# Patient Record
Sex: FEMALE | Race: WHITE
[De-identification: names, ages, dates, MRNs, and addresses within clinical notes are randomized per-mention and may not be internally consistent; named-entity substitution may affect disease eponyms.]

---

## 2017-02-07 ENCOUNTER — HOSPITAL ENCOUNTER (OUTPATIENT)
Dept: HOSPITAL 47 - RADUSWWP | Age: 82
Discharge: HOME | End: 2017-02-07
Payer: MEDICARE

## 2017-02-07 DIAGNOSIS — I65.23: Primary | ICD-10-CM

## 2017-02-07 DIAGNOSIS — I34.0: ICD-10-CM

## 2017-02-07 PROCEDURE — 93880 EXTRACRANIAL BILAT STUDY: CPT

## 2017-02-07 NOTE — US
EXAMINATION TYPE: US carotid duplex BILAT

 

DATE OF EXAM: 2/7/2017 9:26 AM

 

COMPARISON: NONE

 

CLINICAL HISTORY: I65.23 STENOSIS OF CAROTID ARTERY.

 

EXAM MEASUREMENTS: 

 

RIGHT:  Peak Systolic Velocity (PSV) cm/sec

----- Right CCA:  68.2  

----- Right ICA:  87.2     

----- Right ECA:  79.4   

ICA/CCA ratio:  1.3    

 

RIGHT:  End Diastole cm/sec

----- Right CCA:  24.1   

----- Right ICA:  32.8      

----- Right ECA:  14.1     

 

LEFT:  Peak Systolic Velocity (PSV) cm/sec

----- Left CCA:  54.7  

----- Left ICA:  84.2   

----- Left ECA:  104.3  

ICA/CCA ratio:  1.5  

 

LEFT:  End Diastole cm/sec

----- Left CCA:  20.6  

----- Left ICA:  19.3   

----- Left ECA:  13.7 

 

VERTEBRALS (direction of flow):

Right Vertebral: Antegrade

Left Vertebral: Antegrade

 

TECHNOLOGIST IMPRESSION:  Mild to moderate plaque with no significant velocity elevations

 

Gray scale images show mild eccentric hyperechoic shadowing plaque at right carotid bulb. There is le
ss pronounced plaque at left carotid bulb. Velocity measurements and ratios remain within normal limi
ts in visualized portion of both internal carotid arteries.

 

IMPRESSION:  No hemodynamically significant stenosis is seen in either internal carotid artery.

## 2017-02-28 ENCOUNTER — HOSPITAL ENCOUNTER (OUTPATIENT)
Dept: HOSPITAL 47 - RADMAMWWP | Age: 82
Discharge: HOME | End: 2017-02-28
Payer: MEDICARE

## 2017-02-28 DIAGNOSIS — Z12.31: Primary | ICD-10-CM

## 2017-02-28 DIAGNOSIS — M81.0: ICD-10-CM

## 2017-02-28 PROCEDURE — 77080 DXA BONE DENSITY AXIAL: CPT

## 2017-02-28 NOTE — BD
EXAMINATION TYPE: MG DEXA axial skeleton.  

 

DATE OF EXAM: 2/28/2017 10:05 AM

 

COMPARISON: NONE

 

CLINICAL HISTORY:

 

Height:  63.5 IN

Weight:  160 LBS

 

FRAX RISK QUESTIONS:

Alcohol (3 or more units per day):  NO

Family History (Parent hip fracture):  NO

Glucocorticoids (More than 3mos):  NO

           (Ex: prednisone, prednisolone, methylprednisolone, dexamethasone, and hydrocortisone).    
     

History of Fracture in Adulthood: YES

Secondary Osteoporosis:

  1.  Type 1 Diabetes: NO

  2.  Hyperthyroidism: NO

  3.  Menopause before 45: NO AGE 50

  4.  Malnutrition: NO

  5.  Chronic liver disease: NO

Rheumatoid Arthritis: NO

Current Tobacco Use: NO

 

RISK FACTORS 

HISTORY OF: 

Hip Fracture (Right): YES

When: AGE 70

Surgery to Hip(right): YES

When: AGE 70

Other Fractures since Age 50: YES

When: RT HIP AGE 70

Active: MODERATE

Diet low in dairy products/other sources of calcium:  YES

Postmenopausal woman: AGE 50

Lost more than 2 inches in height since high school: YES  APPROX 4"

 

MEDICATIONS: 

Additional Medications: CALCIUM, VIT D, MAGNESIUM, EYE DROPS, BLOOD PRESSURE MEDS, PAIN PILL,GOUT MED
  

 

 

 

EXAM MEASUREMENTS: 

Bone mineral densitometry was performed using the Etohum System.

Bone mineral density as measured about the Lumbar spine is:  

----- L1-L4(G/cm2): 1.185

T Score Values are as follows:

----- L2: -1.8

----- L3: 2.9

----- L4: 2.7

----- L1-L4: 0.0

Bone mineral density BASELINE

 

PT HAD RT HIP FX AND SURGERY AGE 70

Bone mineral density about the L hip (g/cm2): 0.609

T Score values are as follows:

-----L Neck: -3.1

-----L Intertrochanter: -3.0

Bone mineral density BASELINE

 

 

IMPRESSION:

Osteoporosis (T Score less than -2.5) as noted by T Score values at the

There is increased fracture risk and therapy is usually indicated based on age.

Re-Screen 1-2 years.

 

 

 

 

 

NOTE:  T-SCORE=SD OF THE YOUNG ADULT MEAN.

## 2017-03-01 NOTE — MM
Reason for exam: screening  (asymptomatic).



History:

Patient is postmenopausal.



Physical Findings:

A clinical breast exam by your physician is recommended on an annual basis and 

results should be correlated with mammographic findings.



MG Screening Mammo w CAD

Bilateral CC and MLO view(s) were taken.  XCCL view(s) were taken of the left 

breast.

The breast tissue is almost entirely fat.

Finding: There are typically benign diffuse/scattered calcifications. There is no 

discrete abnormality.





ASSESSMENT: Benign, BI-RAD 2



RECOMMENDATION:

Routine screening mammogram of both breasts in 1 year.

## 2017-08-22 ENCOUNTER — HOSPITAL ENCOUNTER (OUTPATIENT)
Dept: HOSPITAL 47 - RADECHMAIN | Age: 82
Discharge: HOME | End: 2017-08-22
Payer: MEDICARE

## 2017-08-22 DIAGNOSIS — I27.2: ICD-10-CM

## 2017-08-22 DIAGNOSIS — I08.1: Primary | ICD-10-CM

## 2017-08-22 PROCEDURE — 93306 TTE W/DOPPLER COMPLETE: CPT

## 2017-08-22 NOTE — ECHOF
Referral Reason:I27.2 pulmonary htn



MEASUREMENTS

--------

HEIGHT: 162.6 cm

WEIGHT: 72.6 kg

BP: 130/66

IVSd:   1.2 cm     (0.6 - 1.1)

LVIDd:   3.5 cm     (3.9 - 5.3)

LVPWd:   1.1 cm     (0.6 - 1.1)

IVSs:   1.6 cm

LVIDs:   2.3 cm

LVPWs:   1.8 cm

Ao Diam:   3.6 cm     (2.0 - 3.7)

AV Cusp:   2.6 cm     (1.5 - 2.6)

LA Diam:   3.0 cm     (2.7 - 3.8)

MV EXCURSION:   21.518 mm     (> 18.000)

MV EF SLOPE:   94 mm/s     (70 - 150)

EPSS:   2.1 cm

MV E David:   0.81 m/s

MV DecT:   239 ms

MV A David:   0.93 m/s

MV E/A Ratio:   0.87 

RAP:   5.00 mmHg

RVSP:   32.13 mmHg







FINDINGS

--------

Sinus rhythm.

This was a technically good study.

There is borderline concentric left ventricular 

hypertrophy.   Overall left ventricular systolic 

function is normal with, an EF between 55 - 60 %.

The right ventricle is normal in size and function.

The left atrium is normal in size.

The right atrium is normal in size.

Aortic valve is trileaflet and is mildly thickened.

The mitral valve leaflets are mildly thickened.   Mild 

mitral regurgitation is present.

Mild tricuspid regurgitation present.   The right 

ventricular systolic pressure, as measured by Doppler, 

is 32.13mmHg.

Pulmonic valve appears structurally normal.



CONCLUSIONS

--------

1. Sinus rhythm.

2. Mild mitral regurgitation is present.

3. Mild tricuspid regurgitation present.

4. The right ventricular systolic pressure, as measured by 

Doppler, is 32.13mmHg.

5. Pulmonic valve appears structurally normal.

6. This was a technically good study.

7. There is borderline concentric left ventricular 

hypertrophy.

8. Overall left ventricular systolic function is normal 

with, an EF between 55 - 60 %.

9. The right ventricle is normal in size and function.

10. The left atrium is normal in size.

11. The right atrium is normal in size.

12. Aortic valve is trileaflet and is mildly thickened.

13. The mitral valve leaflets are mildly thickened.





SONOGRAPHER: Mague Taylor RDCS

## 2018-07-18 ENCOUNTER — HOSPITAL ENCOUNTER (INPATIENT)
Dept: HOSPITAL 47 - EC | Age: 83
LOS: 6 days | Discharge: HOME | DRG: 394 | End: 2018-07-24
Attending: INTERNAL MEDICINE | Admitting: INTERNAL MEDICINE
Payer: MEDICARE

## 2018-07-18 VITALS — BODY MASS INDEX: 22.1 KG/M2

## 2018-07-18 DIAGNOSIS — K55.031: Primary | ICD-10-CM

## 2018-07-18 DIAGNOSIS — K44.9: ICD-10-CM

## 2018-07-18 DIAGNOSIS — Z96.1: ICD-10-CM

## 2018-07-18 DIAGNOSIS — K57.30: ICD-10-CM

## 2018-07-18 DIAGNOSIS — D46.9: ICD-10-CM

## 2018-07-18 DIAGNOSIS — H40.9: ICD-10-CM

## 2018-07-18 DIAGNOSIS — M10.9: ICD-10-CM

## 2018-07-18 DIAGNOSIS — Z79.899: ICD-10-CM

## 2018-07-18 DIAGNOSIS — D61.818: ICD-10-CM

## 2018-07-18 DIAGNOSIS — K29.70: ICD-10-CM

## 2018-07-18 DIAGNOSIS — D51.9: ICD-10-CM

## 2018-07-18 DIAGNOSIS — Z96.643: ICD-10-CM

## 2018-07-18 DIAGNOSIS — Z87.11: ICD-10-CM

## 2018-07-18 DIAGNOSIS — E83.42: ICD-10-CM

## 2018-07-18 DIAGNOSIS — Z98.42: ICD-10-CM

## 2018-07-18 DIAGNOSIS — Z98.41: ICD-10-CM

## 2018-07-18 DIAGNOSIS — Z87.891: ICD-10-CM

## 2018-07-18 DIAGNOSIS — I13.10: ICD-10-CM

## 2018-07-18 DIAGNOSIS — N18.3: ICD-10-CM

## 2018-07-18 LAB
ALBUMIN SERPL-MCNC: 3.5 G/DL (ref 3.5–5)
ALP SERPL-CCNC: 56 U/L (ref 38–126)
ALT SERPL-CCNC: 24 U/L (ref 9–52)
ANION GAP SERPL CALC-SCNC: 8 MMOL/L
APTT BLD: 18.7 SEC (ref 22–30)
AST SERPL-CCNC: 17 U/L (ref 14–36)
BASOPHILS # BLD AUTO: 0 K/UL (ref 0–0.2)
BASOPHILS NFR BLD AUTO: 0 %
BUN SERPL-SCNC: 25 MG/DL (ref 7–17)
CALCIUM SPEC-MCNC: 9.5 MG/DL (ref 8.4–10.2)
CHLORIDE SERPL-SCNC: 106 MMOL/L (ref 98–107)
CK SERPL-CCNC: 51 U/L (ref 30–135)
CO2 SERPL-SCNC: 25 MMOL/L (ref 22–30)
EOSINOPHIL # BLD AUTO: 0 K/UL (ref 0–0.7)
EOSINOPHIL NFR BLD AUTO: 1 %
ERYTHROCYTE [DISTWIDTH] IN BLOOD BY AUTOMATED COUNT: 2.62 M/UL (ref 3.8–5.4)
ERYTHROCYTE [DISTWIDTH] IN BLOOD: 15.2 % (ref 11.5–15.5)
GLUCOSE SERPL-MCNC: 102 MG/DL (ref 74–99)
HCT VFR BLD AUTO: 29.1 % (ref 34–46)
HGB BLD-MCNC: 9.8 GM/DL (ref 11.4–16)
INR PPP: 1.1 (ref ?–1.2)
LYMPHOCYTES # SPEC AUTO: 1.5 K/UL (ref 1–4.8)
LYMPHOCYTES NFR SPEC AUTO: 32 %
MAGNESIUM SPEC-SCNC: 0.9 MG/DL (ref 1.6–2.3)
MCH RBC QN AUTO: 37.2 PG (ref 25–35)
MCHC RBC AUTO-ENTMCNC: 33.5 G/DL (ref 31–37)
MCV RBC AUTO: 111.1 FL (ref 80–100)
MONOCYTES # BLD AUTO: 0.2 K/UL (ref 0–1)
MONOCYTES NFR BLD AUTO: 5 %
NEUTROPHILS # BLD AUTO: 2.8 K/UL (ref 1.3–7.7)
NEUTROPHILS NFR BLD AUTO: 60 %
PLATELET # BLD AUTO: 49 K/UL (ref 150–450)
POTASSIUM SERPL-SCNC: 4 MMOL/L (ref 3.5–5.1)
PROT SERPL-MCNC: 6 G/DL (ref 6.3–8.2)
PT BLD: 10.4 SEC (ref 9–12)
SODIUM SERPL-SCNC: 139 MMOL/L (ref 137–145)
TROPONIN I SERPL-MCNC: <0.012 NG/ML (ref 0–0.03)
WBC # BLD AUTO: 4.7 K/UL (ref 3.8–10.6)

## 2018-07-18 PROCEDURE — 43239 EGD BIOPSY SINGLE/MULTIPLE: CPT

## 2018-07-18 PROCEDURE — 82272 OCCULT BLD FECES 1-3 TESTS: CPT

## 2018-07-18 PROCEDURE — 83921 ORGANIC ACID SINGLE QUANT: CPT

## 2018-07-18 PROCEDURE — 86901 BLOOD TYPING SEROLOGIC RH(D): CPT

## 2018-07-18 PROCEDURE — 84165 PROTEIN E-PHORESIS SERUM: CPT

## 2018-07-18 PROCEDURE — 85027 COMPLETE CBC AUTOMATED: CPT

## 2018-07-18 PROCEDURE — 82728 ASSAY OF FERRITIN: CPT

## 2018-07-18 PROCEDURE — 36415 COLL VENOUS BLD VENIPUNCTURE: CPT

## 2018-07-18 PROCEDURE — 96365 THER/PROPH/DIAG IV INF INIT: CPT

## 2018-07-18 PROCEDURE — 45380 COLONOSCOPY AND BIOPSY: CPT

## 2018-07-18 PROCEDURE — 84484 ASSAY OF TROPONIN QUANT: CPT

## 2018-07-18 PROCEDURE — 86334 IMMUNOFIX E-PHORESIS SERUM: CPT

## 2018-07-18 PROCEDURE — 83735 ASSAY OF MAGNESIUM: CPT

## 2018-07-18 PROCEDURE — 83550 IRON BINDING TEST: CPT

## 2018-07-18 PROCEDURE — 86900 BLOOD TYPING SEROLOGIC ABO: CPT

## 2018-07-18 PROCEDURE — 85610 PROTHROMBIN TIME: CPT

## 2018-07-18 PROCEDURE — 82747 ASSAY OF FOLIC ACID RBC: CPT

## 2018-07-18 PROCEDURE — 80048 BASIC METABOLIC PNL TOTAL CA: CPT

## 2018-07-18 PROCEDURE — 74022 RADEX COMPL AQT ABD SERIES: CPT

## 2018-07-18 PROCEDURE — 80053 COMPREHEN METABOLIC PANEL: CPT

## 2018-07-18 PROCEDURE — 86038 ANTINUCLEAR ANTIBODIES: CPT

## 2018-07-18 PROCEDURE — 86431 RHEUMATOID FACTOR QUANT: CPT

## 2018-07-18 PROCEDURE — 83540 ASSAY OF IRON: CPT

## 2018-07-18 PROCEDURE — 82607 VITAMIN B-12: CPT

## 2018-07-18 PROCEDURE — 96375 TX/PRO/DX INJ NEW DRUG ADDON: CPT

## 2018-07-18 PROCEDURE — 86850 RBC ANTIBODY SCREEN: CPT

## 2018-07-18 PROCEDURE — 88305 TISSUE EXAM BY PATHOLOGIST: CPT

## 2018-07-18 PROCEDURE — 83010 ASSAY OF HAPTOGLOBIN QUANT: CPT

## 2018-07-18 PROCEDURE — 99285 EMERGENCY DEPT VISIT HI MDM: CPT

## 2018-07-18 PROCEDURE — 85025 COMPLETE CBC W/AUTO DIFF WBC: CPT

## 2018-07-18 PROCEDURE — 82553 CREATINE MB FRACTION: CPT

## 2018-07-18 PROCEDURE — 83883 ASSAY NEPHELOMETRY NOT SPEC: CPT

## 2018-07-18 PROCEDURE — 85730 THROMBOPLASTIN TIME PARTIAL: CPT

## 2018-07-18 PROCEDURE — 85045 AUTOMATED RETICULOCYTE COUNT: CPT

## 2018-07-18 PROCEDURE — 93005 ELECTROCARDIOGRAM TRACING: CPT

## 2018-07-18 PROCEDURE — 82550 ASSAY OF CK (CPK): CPT

## 2018-07-18 RX ADMIN — CEFAZOLIN SCH MLS/HR: 330 INJECTION, POWDER, FOR SOLUTION INTRAMUSCULAR; INTRAVENOUS at 23:27

## 2018-07-18 RX ADMIN — MAGNESIUM SULFATE IN DEXTROSE SCH MLS/HR: 10 INJECTION, SOLUTION INTRAVENOUS at 21:20

## 2018-07-18 RX ADMIN — DORZOLAMIDE HYDROCHLORIDE SCH DROPS: 20 SOLUTION/ DROPS OPHTHALMIC at 23:27

## 2018-07-18 RX ADMIN — CARVEDILOL SCH MG: 12.5 TABLET, FILM COATED ORAL at 23:27

## 2018-07-18 RX ADMIN — MAGNESIUM SULFATE IN DEXTROSE SCH MLS/HR: 10 INJECTION, SOLUTION INTRAVENOUS at 23:27

## 2018-07-18 NOTE — ED
General Adult HPI





- General


Chief complaint: GI Bleed


Stated complaint: GI Bleed


Source: patient


Mode of arrival: EMS


Limitations: no limitations





- History of Present Illness


Initial comments: 





Dictation was produced using dragon dictation software. please excuse any 

grammatical, word or spelling errors. 





Chief Complaint: 84-year-old  female past medical history of glaucoma, 

gout, anemia presents with bright red blood per rectum 1 day.





History of Present Illness: Patient states that earlier today she was having a 

bowel movement she stood up and noted bright red blood per rectum.  She states 

that there was an exquisite amount of bright red blood in the toilet.  Patient 

was concerned and came to the emergency department.  Patient denies any 

abdominal pain.  No nausea vomiting.  No lightheadedness.  Patient otherwise 

feels well.  Denies any history of GI bleed.








The ROS documented in this emergency department record has been reviewed and 

confirmed by me.  Those systems with pertinent positive or negative responses 

have been documented in the HPI.  All other systems are other negative and/or 

noncontributory.





- Related Data


 Home Medications











 Medication  Instructions  Recorded  Confirmed


 


Allopurinol [Zyloprim] 100 mg PO DAILY 18


 


Carvedilol [Coreg] 12.5 mg PO BID 18


 


Losartan/Hydrochlorothiazide 1 tab PO DAILY 18





[Losartan-Hctz 100-12.5 mg Tab]   


 


Travon/D3/Mag11/Zinc//Alex/Bor 1 tab PO DAILY 18





[Caltrate 600+D Plus Tablet]   


 


Calcitriol [Rocaltrol] 0.25 mcg PO DAILY 18


 


Dorzolamide/Timolol/Pf [Cosopt Pf 1 drop RIGHT EYE BID 18





2%/5% Ophth Droperette]   


 


Latanoprost Ophth [Xalatan 0.005%] 1 drop BOTH EYES HS 18


 


Ranitidine HCl [Zantac] 150 mg PO HS 18











 Allergies











Allergy/AdvReac Type Severity Reaction Status Date / Time


 


No Known Allergies Allergy   Verified 18 20:28














Review of Systems


ROS Statement: 


Those systems with pertinent positive or pertinent negative responses have been 

documented in the HPI.





ROS Other: All systems not noted in ROS Statement are negative.





Past Medical History


Past Medical History: Hypertension


Additional Past Medical History / Comment(s): glaucoma, gout, anemia


History of Any Multi-Drug Resistant Organisms: None Reported


Past Surgical History: Joint Replacement, Orthopedic Surgery


Past Psychological History: No Psychological Hx Reported


Smoking Status: Former smoker


Past Alcohol Use History: None Reported


Past Drug Use History: None Reported





- Past Family History


  ** Father


Additional Family Medical History / Comment(s): Father  in his 60s





  ** Mother


Additional Family Medical History / Comment(s): Mother  in her 90s from old 

age.  Patient has 1 sister with no major medical problems.  Patient does not 

have any brothers or children.





General Exam





- General Exam Comments


Initial Comments: 











PHYSICAL EXAM:


General Impression: Alert and oriented x3, not in acute distress


HEENT: Normocephalic atraumatic, extra-ocular movements intact, pupils equal 

and reactive to light bilaterally, mucous membranes moist.


Cardiovascular: Heart regular rate and rhythm, S1&S2 audible, no murmurs, rubs 

or gallops


Chest: Lungs clear to auscultation bilaterally, no rhonchi, no wheeze, no rales


Abdomen: Bowel sounds present, abdomen soft, non-tender, non-distended, no 

organomegaly


Musculoskeletal: Pulses present and equal in all extremities, no peripheral 

edema


Motor: Power 5/5 bilaterally, no focal deficits noted


Neurological: CN II-XII grossly intact, no focal motor or sensory deficits noted


Skin: Intact with no visualized rashes


Psych: Normal affect and mood


Rectal exam: Rectal skin tag, still guaiac positive for gross blood.


Limitations: no limitations





Course


 Vital Signs











  18





  19:32 21:21


 


Temperature 97.7 F 


 


Pulse Rate 86 73


 


Respiratory 20 20





Rate  


 


Blood Pressure 162/72 155/65


 


O2 Sat by Pulse 98 96





Oximetry  














Medical Decision Making





- Medical Decision Making





ED course: 84-year-old female with bright red blood per rectum.  Vital signs 

upon arrival shows hypertension 162/72, rest of vital signs within normal 

limits.Laboratory evaluation obtained.  No leukocytosis.  Hemoglobin is 9.8.  

There findings to suggest macrocytic anemia.  Platelet count is 49.  Coag panel 

shows findings within acceptable limits.  There is no old labs for comparison.  

Metabolic panel obtained.  Elevated renal markers.  Glucose 12.  Magnesium is 

0.9.  Stool occult blood is positive.  Struck suspicion for lower GI bleed 

however there is possibility of this upper GI bleed.  Patient given Protonix.  

Patient not bleeding at the moment.  No clear indication for platelet 

transfusion at this time.  Magnesium replaced.  Patient be admitted with 

gastroenterology consult.  Patient continued to receive fluid resuscitation and 

magnesium.











EKG Interpretation:


A 12 lead EKG was obtained. It was interpreted by myself and attending 

physician. There is a P wave before every QRS complex. Rate is 75. Rhythm is 

sinus rhythm, NM interval 156, QRS 72, . QT is not prolonged. No ST 

segment depression or elevation.  Overall, this EKG is unremarkable





- Lab Data


Result diagrams: 


 18 07:22





 18 07:22


 Lab Results











  18 Range/Units





  19:46 19:46 19:46 


 


WBC    4.7  (3.8-10.6)  k/uL


 


RBC    2.62 L  (3.80-5.40)  m/uL


 


Hgb    9.8 L  (11.4-16.0)  gm/dL


 


Hct    29.1 L  (34.0-46.0)  %


 


MCV    111.1 H  (80.0-100.0)  fL


 


MCH    37.2 H  (25.0-35.0)  pg


 


MCHC    33.5  (31.0-37.0)  g/dL


 


RDW    15.2  (11.5-15.5)  %


 


Plt Count    49 L*  (150-450)  k/uL


 


Neutrophils %    60  %


 


Lymphocytes %    32  %


 


Monocytes %    5  %


 


Eosinophils %    1  %


 


Basophils %    0  %


 


Neutrophils #    2.8  (1.3-7.7)  k/uL


 


Lymphocytes #    1.5  (1.0-4.8)  k/uL


 


Monocytes #    0.2  (0-1.0)  k/uL


 


Eosinophils #    0.0  (0-0.7)  k/uL


 


Basophils #    0.0  (0-0.2)  k/uL


 


Polychromasia    Present  


 


Macrocytosis    Marked  


 


PT     (9.0-12.0)  sec


 


INR     (<1.2)  


 


APTT     (22.0-30.0)  sec


 


Sodium     (137-145)  mmol/L


 


Potassium     (3.5-5.1)  mmol/L


 


Chloride     ()  mmol/L


 


Carbon Dioxide     (22-30)  mmol/L


 


Anion Gap     mmol/L


 


BUN     (7-17)  mg/dL


 


Creatinine     (0.52-1.04)  mg/dL


 


Est GFR (CKD-EPI)AfAm     (>60 ml/min/1.73 sqM)  


 


Est GFR (CKD-EPI)NonAf     (>60 ml/min/1.73 sqM)  


 


Glucose     (74-99)  mg/dL


 


Calcium     (8.4-10.2)  mg/dL


 


Magnesium     (1.6-2.3)  mg/dL


 


Total Bilirubin     (0.2-1.3)  mg/dL


 


AST     (14-36)  U/L


 


ALT     (9-52)  U/L


 


Alkaline Phosphatase     ()  U/L


 


Total Creatine Kinase   51   ()  U/L


 


CK-MB (CK-2)   1.4   (0.0-2.4)  ng/mL


 


CK-MB (CK-2) Rel Index   2.7   


 


Troponin I   <0.012   (0.000-0.034)  ng/mL


 


Total Protein     (6.3-8.2)  g/dL


 


Albumin     (3.5-5.0)  g/dL


 


Stool Occult Blood  Positive H    (Negative)  


 


Blood Type     


 


Blood Type Recheck     


 


Antibody Screen     


 


Spec Expiration Date     














  18 Range/Units





  19:46 19:46 19:46 


 


WBC     (3.8-10.6)  k/uL


 


RBC     (3.80-5.40)  m/uL


 


Hgb     (11.4-16.0)  gm/dL


 


Hct     (34.0-46.0)  %


 


MCV     (80.0-100.0)  fL


 


MCH     (25.0-35.0)  pg


 


MCHC     (31.0-37.0)  g/dL


 


RDW     (11.5-15.5)  %


 


Plt Count     (150-450)  k/uL


 


Neutrophils %     %


 


Lymphocytes %     %


 


Monocytes %     %


 


Eosinophils %     %


 


Basophils %     %


 


Neutrophils #     (1.3-7.7)  k/uL


 


Lymphocytes #     (1.0-4.8)  k/uL


 


Monocytes #     (0-1.0)  k/uL


 


Eosinophils #     (0-0.7)  k/uL


 


Basophils #     (0-0.2)  k/uL


 


Polychromasia     


 


Macrocytosis     


 


PT   10.4   (9.0-12.0)  sec


 


INR   1.1   (<1.2)  


 


APTT   18.7 L   (22.0-30.0)  sec


 


Sodium  139    (137-145)  mmol/L


 


Potassium  4.0    (3.5-5.1)  mmol/L


 


Chloride  106    ()  mmol/L


 


Carbon Dioxide  25    (22-30)  mmol/L


 


Anion Gap  8    mmol/L


 


BUN  25 H    (7-17)  mg/dL


 


Creatinine  1.50 H    (0.52-1.04)  mg/dL


 


Est GFR (CKD-EPI)AfAm  37    (>60 ml/min/1.73 sqM)  


 


Est GFR (CKD-EPI)NonAf  32    (>60 ml/min/1.73 sqM)  


 


Glucose  102 H    (74-99)  mg/dL


 


Calcium  9.5    (8.4-10.2)  mg/dL


 


Magnesium  0.9 L*    (1.6-2.3)  mg/dL


 


Total Bilirubin  0.5    (0.2-1.3)  mg/dL


 


AST  17    (14-36)  U/L


 


ALT  24    (9-52)  U/L


 


Alkaline Phosphatase  56    ()  U/L


 


Total Creatine Kinase     ()  U/L


 


CK-MB (CK-2)     (0.0-2.4)  ng/mL


 


CK-MB (CK-2) Rel Index     


 


Troponin I     (0.000-0.034)  ng/mL


 


Total Protein  6.0 L    (6.3-8.2)  g/dL


 


Albumin  3.5    (3.5-5.0)  g/dL


 


Stool Occult Blood     (Negative)  


 


Blood Type    A Negative  


 


Blood Type Recheck    No  


 


Antibody Screen    NEGATIVE  


 


Spec Expiration Date    2018  














Disposition


Clinical Impression: 


 GI bleed





Disposition: ADMITTED AS IP TO THIS [facility]


Condition: Fair


Is patient prescribed a controlled substance at d/c from ED?: No


Time of Disposition: 12:30

## 2018-07-19 LAB
ALBUMIN SERPL-MCNC: 3.1 G/DL (ref 3.5–5)
ALP SERPL-CCNC: 51 U/L (ref 38–126)
ALT SERPL-CCNC: 26 U/L (ref 9–52)
ANION GAP SERPL CALC-SCNC: 7 MMOL/L
AST SERPL-CCNC: 15 U/L (ref 14–36)
BASOPHILS # BLD AUTO: 0 K/UL (ref 0–0.2)
BASOPHILS NFR BLD AUTO: 0 %
BUN SERPL-SCNC: 22 MG/DL (ref 7–17)
CALCIUM SPEC-MCNC: 9.1 MG/DL (ref 8.4–10.2)
CHLORIDE SERPL-SCNC: 106 MMOL/L (ref 98–107)
CO2 SERPL-SCNC: 27 MMOL/L (ref 22–30)
EOSINOPHIL # BLD AUTO: 0 K/UL (ref 0–0.7)
EOSINOPHIL NFR BLD AUTO: 1 %
ERYTHROCYTE [DISTWIDTH] IN BLOOD BY AUTOMATED COUNT: 2.38 M/UL (ref 3.8–5.4)
ERYTHROCYTE [DISTWIDTH] IN BLOOD: 15.2 % (ref 11.5–15.5)
GLUCOSE SERPL-MCNC: 93 MG/DL (ref 74–99)
HCT VFR BLD AUTO: 27.1 % (ref 34–46)
HGB BLD-MCNC: 8.8 GM/DL (ref 11.4–16)
LYMPHOCYTES # SPEC AUTO: 1.5 K/UL (ref 1–4.8)
LYMPHOCYTES NFR SPEC AUTO: 36 %
MCH RBC QN AUTO: 36.9 PG (ref 25–35)
MCHC RBC AUTO-ENTMCNC: 32.5 G/DL (ref 31–37)
MCV RBC AUTO: 113.5 FL (ref 80–100)
MONOCYTES # BLD AUTO: 0.3 K/UL (ref 0–1)
MONOCYTES NFR BLD AUTO: 6 %
NEUTROPHILS # BLD AUTO: 2.3 K/UL (ref 1.3–7.7)
NEUTROPHILS NFR BLD AUTO: 54 %
PLATELET # BLD AUTO: 42 K/UL (ref 150–450)
POTASSIUM SERPL-SCNC: 4 MMOL/L (ref 3.5–5.1)
PROT SERPL-MCNC: 5.5 G/DL (ref 6.3–8.2)
SODIUM SERPL-SCNC: 140 MMOL/L (ref 137–145)
WBC # BLD AUTO: 4.2 K/UL (ref 3.8–10.6)

## 2018-07-19 RX ADMIN — HYDROCHLOROTHIAZIDE SCH MG: 12.5 CAPSULE ORAL at 08:46

## 2018-07-19 RX ADMIN — ALLOPURINOL SCH MG: 100 TABLET ORAL at 08:46

## 2018-07-19 RX ADMIN — PANTOPRAZOLE SODIUM SCH MG: 40 INJECTION, POWDER, FOR SOLUTION INTRAVENOUS at 08:45

## 2018-07-19 RX ADMIN — CARVEDILOL SCH MG: 12.5 TABLET, FILM COATED ORAL at 17:38

## 2018-07-19 RX ADMIN — POTASSIUM CHLORIDE SCH: 14.9 INJECTION, SOLUTION INTRAVENOUS at 18:15

## 2018-07-19 RX ADMIN — TIMOLOL MALEATE SCH DROPS: 5 SOLUTION OPHTHALMIC at 08:47

## 2018-07-19 RX ADMIN — CARVEDILOL SCH MG: 12.5 TABLET, FILM COATED ORAL at 08:46

## 2018-07-19 RX ADMIN — CEFAZOLIN SCH MLS/HR: 330 INJECTION, POWDER, FOR SOLUTION INTRAMUSCULAR; INTRAVENOUS at 14:34

## 2018-07-19 RX ADMIN — DORZOLAMIDE HYDROCHLORIDE SCH DROPS: 20 SOLUTION/ DROPS OPHTHALMIC at 08:46

## 2018-07-19 RX ADMIN — DORZOLAMIDE HYDROCHLORIDE SCH DROPS: 20 SOLUTION/ DROPS OPHTHALMIC at 21:25

## 2018-07-19 RX ADMIN — LOSARTAN POTASSIUM SCH MG: 50 TABLET, FILM COATED ORAL at 08:47

## 2018-07-19 RX ADMIN — PANTOPRAZOLE SODIUM SCH MG: 40 INJECTION, POWDER, FOR SOLUTION INTRAVENOUS at 21:25

## 2018-07-19 NOTE — P.CONS
History of Present Illness





- Reason for Consult


Consult date: 07/19/18


Rectal bleeding


Requesting physician: Marc Talley





- History of Present Illness


84-year-old female with a past medical history of remote peptic ulcer disease 

in the 1980s, GI bleed 2007 secondary to perforated duodenal ulcer status post 

pyloroplasty, anemia B12 deficiency, hypertension, hypertensive cardiovascular 

disease.  Patient presented with painless rectal bleeding that started 

yesterday.  She hasn't been feeling well for over the past week that it was he 

related.  Yesterday she passed one large bowel movement that was bright red 

burgundy in nature without abdominal pain.  Denies coffee-ground hematemesis, 

melena.  





Admission hemoglobin 9.8.  .  Platelet 49,000.  INR 1.1.  BUN 25.  

Creatinine 1.5.  Hemoccult stool positive.  No history of thrombocytopenia.  No 

changes in medications.





Presently she feels well without abdominal pain.  No recurrence of rectal 

bleeding.





Last EGD record was in 2008 no evidence of peptic ulcer disease.  Dr. Kinsey 

office called last  colonoscopy performed 2009 report not available to fax.











Review of Systems


Constitutional: Denies fever, chills, sweats, weight gain, or loss.


HEENT: Negative for migraines, blurred vision or loss, earaches, drainage, 

tinnitus, oral mucosal lesions, dysphagia, or odynophagia.


CARDIAC: Negative for chest pain, arrhythmias, or palpitation.


RESPIRATORY: Negative for shortness of breath, hemoptysis, cough, or sputum 

production.


GI: See HPI for pertinent findings.


: Negative for hematuria, urgency, frequency, polyuria, or dysuria.


GYNc: Denies possibility of pregnancy.  Negative vaginal discharge.


MUSCULOSKELETAL: Negative for muscle aches, swelling, arthritis, and 

arthralgias.  


NEUROLOGIC: Negative for stroke or TIA.


ENDOCRINE: Negative for thyroid problems.


SKIN: Negative for rash or itching.


PSYCHIATRIC: Negative history for depression and anxiety








Past Medical History


Past Medical History: Hypertension


Additional Past Medical History / Comment(s): glaucoma, gout, anemia


History of Any Multi-Drug Resistant Organisms: None Reported


Past Surgical History: Joint Replacement, Orthopedic Surgery


Past Psychological History: No Psychological Hx Reported


Smoking Status: Former smoker


Past Alcohol Use History: None Reported


Past Drug Use History: None Reported





Medications and Allergies


 Home Medications











 Medication  Instructions  Recorded  Confirmed  Type


 


Allopurinol [Zyloprim] 100 mg PO DAILY 07/18/18 07/19/18 History


 


Carvedilol [Coreg] 12.5 mg PO BID 07/18/18 07/19/18 History


 


Losartan/Hydrochlorothiazide 1 tab PO DAILY 07/18/18 07/19/18 History





[Losartan-Hctz 100-12.5 mg Tab]    


 


Travon/D3/Mag11/Zinc//Alex/Bor 1 tab PO DAILY 07/19/18 07/19/18 History





[Caltrate 600+D Plus Tablet]    


 


Calcitriol [Rocaltrol] 0.25 mcg PO DAILY 07/19/18 07/19/18 History


 


Dorzolamide/Timolol/Pf [Cosopt Pf 1 drop RIGHT EYE BID 07/19/18 07/19/18 History





2%/5% Ophth Droperette]    


 


Latanoprost Ophth [Xalatan 0.005%] 1 drop BOTH EYES HS 07/19/18 07/19/18 History


 


Ranitidine HCl [Zantac] 150 mg PO HS 07/19/18 07/19/18 History











 Allergies











Allergy/AdvReac Type Severity Reaction Status Date / Time


 


No Known Allergies Allergy   Verified 07/18/18 20:28














Physical Exam


Vitals: 


 Vital Signs











  Temp Pulse Pulse Resp BP BP Pulse Ox


 


 07/19/18 06:06  96.4 F L   60  18   119/63  97


 


 07/18/18 23:00  97.7 F   69  17   147/70  100


 


 07/18/18 21:21   73   20  155/65   96


 


 07/18/18 19:32  97.7 F  86   20  162/72   98








 Intake and Output











 07/18/18 07/19/18 07/19/18





 22:59 06:59 14:59


 


Intake Total 500  


 


Balance 500  


 


Intake:   


 


  Amount of Fluid Infused ( 500  





  ml)   


 


Other:   


 


  # Voids  1 


 


  Weight 66.088 kg 66.088 kg 











General appearance: The patient is alert, oriented, in no acute distress.


HET: Head is normocephalic and atraumatic.  Pupils are equal and reactive.  

Oropharynx is clear without lesions.


Neck: Supple without lymphadenopathy.  Trachea midline.


Heart: S1 S2.  Regular rate and rhythm.


Lungs: No crackles or wheezes are heard.


Abdomen: Soft, nontender, nondistended with  bowel sounds.  No peritoneal 

signs.  No palpable organomegaly or masses.


Extremities: Normal skin color and turgor.  No cyanosis, rash, ulceration, 

clubbing, or edema.  Radial and pedal pulses are 2/4 bilaterally.


Neurological: No focal deficits.  Strength and sensation are grossly intact.








Results


CBC & Chem 7: 


 07/19/18 07:22





 07/19/18 07:22


Labs: 


 Abnormal Lab Results - Last 24 Hours (Table)











  07/18/18 07/18/18 07/18/18 Range/Units





  19:46 19:46 19:46 


 


RBC   2.62 L   (3.80-5.40)  m/uL


 


Hgb   9.8 L   (11.4-16.0)  gm/dL


 


Hct   29.1 L   (34.0-46.0)  %


 


MCV   111.1 H   (80.0-100.0)  fL


 


MCH   37.2 H   (25.0-35.0)  pg


 


Plt Count   49 L*   (150-450)  k/uL


 


APTT     (22.0-30.0)  sec


 


BUN    25 H  (7-17)  mg/dL


 


Creatinine    1.50 H  (0.52-1.04)  mg/dL


 


Glucose    102 H  (74-99)  mg/dL


 


Magnesium    0.9 L*  (1.6-2.3)  mg/dL


 


Total Protein    6.0 L  (6.3-8.2)  g/dL


 


Stool Occult Blood  Positive H    (Negative)  














  07/18/18 Range/Units





  19:46 


 


RBC   (3.80-5.40)  m/uL


 


Hgb   (11.4-16.0)  gm/dL


 


Hct   (34.0-46.0)  %


 


MCV   (80.0-100.0)  fL


 


MCH   (25.0-35.0)  pg


 


Plt Count   (150-450)  k/uL


 


APTT  18.7 L  (22.0-30.0)  sec


 


BUN   (7-17)  mg/dL


 


Creatinine   (0.52-1.04)  mg/dL


 


Glucose   (74-99)  mg/dL


 


Magnesium   (1.6-2.3)  mg/dL


 


Total Protein   (6.3-8.2)  g/dL


 


Stool Occult Blood   (Negative)  














Assessment and Plan


(1) Rectal bleeding


Narrative/Plan: 


Possible colonic diverticular in nature exacerbated by thrombocytopenia.  Upper 

GI source cannot be entirely excluded patient is without epigastric pain 

hematemesis or melena.  History of underlying perforated bleeding peptic 

duodenal ulcer disease in 2007.


Current Visit: Yes   Status: Acute   Code(s): K62.5 - HEMORRHAGE OF ANUS AND 

RECTUM   SNOMED Code(s): 34681291


   





(2) Thrombocytopenia


Narrative/Plan: 


Etiology unclear


Current Visit: Yes   Status: Acute   Code(s): D69.6 - THROMBOCYTOPENIA, 

UNSPECIFIED   SNOMED Code(s): 751385511


   





(3) Anemia


Narrative/Plan: 


Macrocytic anemia.  History of B12 deficiency anemia component of acute blood 

loss


Current Visit: Yes   Status: Acute   Code(s): D64.9 - ANEMIA, UNSPECIFIED   

SNOMED Code(s): 377166605


   


Plan: 


1.  Will allow clear liquids this morning.  CBC monitoring; await morning 

result reevaluate platelet count.  Protonix 40 mg daily.  Inpatient endoscopy 

discussed EGD/colonoscopy scheduled tomorrow. Dr. Griffiths prefers platelet count 

to be greater than 50,000 before proceeding with endoscopic exams especially if 

biopsies and/or polypectomies need to be performed therefore will transfuse 

platelets tomorrow morning prior to procedures.  Will follow closely with you.








Thank you for this kind referral and the opportunity to participate in the care 

of your patient.  This consultation was discussed with Dr. Griffiths.  The 

impression and plan of care have been directed as dictated.

## 2018-07-19 NOTE — P.HPIM
History of Present Illness


H&P Date: 18


Chief Complaint: Bright red rectal bleeding





This is an 84-year-old  female patient of Dr. Sheldon. with past history 

of hypertension, glaucoma, gout, anemia.  Patient states the last time she had 

blood work done with Dr. Sheldon, he told her that she was anemic.  Patient has 

had colonoscopies in the past with Dr. Kinsey on several occasions and she 

denies any problems with those in the past.  She states that yesterday she had 

bright red bleeding in the toilet.  She denies any maroon color to her stools.  

She states she has had this 9010 years ago which time she had vomiting and 

bloody stool secondary to a perforated ulcer.  Patient denies having any 

abdominal pain but states her abdomen just doesn't feel right.  She denies any 

lightheadedness.  Patient came into [facility] emergency 

center for evaluation.  She was found to have a hemoglobin of 9.8, platelet 

count is currently 42, INR 1.1, creatinine 1.5.  Hemoccult blood was positive.  

Patient has been admitted to the Medr floor and consult with GI has been 

requested.  Magnesium was also low at 0.8 and patient was given 2 g of 

magnesium sulfate.





Review of Systems


All systems: negative


Constitutional: Denies chills, Denies fatigue, Denies fever, Denies sweats, 

Denies weakness, Denies weight loss


Eyes: denies blurred vision, denies pain


Ears, nose, mouth and throat: Denies headache, Denies sore throat, Denies 

vertigo


Cardiovascular: Denies chest pain, Denies decreased exercise tolerance, Denies 

dyspnea on exertion, Denies edema, Denies leg edema, Denies lightheadedness, 

Denies shortness of breath, Denies syncope


Respiratory: Denies cough, Denies cough with sputum, Denies dyspnea, Denies 

excessive sputum, Denies hemoptysis, Denies home oxygen, Denies wheezing


Gastrointestinal: Reports melena, Denies abdominal pain, Denies diarrhea, 

Denies nausea, Denies vomiting


Genitourinary: Denies dysuria, Denies hematuria


Musculoskeletal: Denies frequent falls, Denies myalgias


Integumentary: Denies pruritus, Denies rash


Neurological: Denies numbness, Denies weakness


Psychiatric: Denies anxiety, Denies depression


Endocrine: Denies fatigue, Denies weight change





Past Medical History


Past Medical History: Hypertension


Additional Past Medical History / Comment(s): glaucoma, gout, anemia


History of Any Multi-Drug Resistant Organisms: None Reported


Past Surgical History: Joint Replacement, Orthopedic Surgery


Additional Past Surgical History / Comment(s): Partial right hip replacement, 

bilateral cataract removal and intraocular lens implants


Past Psychological History: No Psychological Hx Reported


Smoking Status: Former smoker


Past Alcohol Use History: None Reported


Additional Past Alcohol Use History / Comment(s): Patient was a smoker one pack 

per day for 40 years ago 10 years ago.  She drinks alcohol rarely.  She denies 

any marijuana or street drug use.  She does not have CPAP, oxygen or nebulizer 

at home.


Past Drug Use History: None Reported





- Past Family History


  ** Father


Additional Family Medical History / Comment(s): Father  in his 60s





  ** Mother


Additional Family Medical History / Comment(s): Mother  in her 90s from old 

age.  Patient has 1 sister with no major medical problems.  Patient does not 

have any brothers or children.





Medications and Allergies


 Home Medications











 Medication  Instructions  Recorded  Confirmed  Type


 


Allopurinol [Zyloprim] 100 mg PO DAILY 18 History


 


Carvedilol [Coreg] 12.5 mg PO BID 18 History


 


Losartan/Hydrochlorothiazide 1 tab PO DAILY 18 History





[Losartan-Hctz 100-12.5 mg Tab]    


 


Travon/D3/Mag11/Zinc//Alex/Bor 1 tab PO DAILY 18 History





[Caltrate 600+D Plus Tablet]    


 


Calcitriol [Rocaltrol] 0.25 mcg PO DAILY 18 History


 


Dorzolamide/Timolol/Pf [Cosopt Pf 1 drop RIGHT EYE BID 18 History





2%/5% Ophth Droperette]    


 


Latanoprost Ophth [Xalatan 0.005%] 1 drop BOTH EYES HS 18 History


 


Ranitidine HCl [Zantac] 150 mg PO HS 18 History











 Allergies











Allergy/AdvReac Type Severity Reaction Status Date / Time


 


No Known Allergies Allergy   Verified 18 20:28














Physical Exam


Vitals: 


 Vital Signs











  Temp Pulse Pulse Resp BP BP Pulse Ox


 


 18 06:06  96.4 F L   60  18   119/63  97


 


 18 23:00  97.7 F   69  17   147/70  100


 


 18 21:21   73   20  155/65   96


 


 18 19:32  97.7 F  86   20  162/72   98








 Intake and Output











 18





 22:59 06:59 14:59


 


Intake Total 500  


 


Balance 500  


 


Intake:   


 


  Amount of Fluid Infused ( 500  





  ml)   


 


Other:   


 


  # Voids  1 


 


  Weight 66.088 kg 66.088 kg 

















Gen: This is an 84-year-old  female.  She is sitting up in bed and 

appears to be comfortable and in no acute distress.


HEENT: Head is atraumatic, normocephalic. Pupils equal, round. Sclerae is 

anicteric. 


NECK: Supple. No JVD. No lymphadenopathy. No thyromegaly. 


LUNGS: Clear to auscultation. No wheezes or rhonchi.  No intercostal 

retractions.


HEART: Regular rate and rhythm. No murmur. 


ABDOMEN: Soft. Bowel sounds are present. No masses.  No tenderness.


EXTREMITIES: No pedal edema.  No calf tenderness.


NEUROLOGICAL: Patient is awake, alert and oriented x3. Cranial nerves 2 through 

12 are grossly intact. 








Results


CBC & Chem 7: 


 18 07:22





 18 07:22


Labs: 


 Abnormal Lab Results - Last 24 Hours (Table)











  18 Range/Units





  19:46 19:46 19:46 


 


RBC   2.62 L   (3.80-5.40)  m/uL


 


Hgb   9.8 L   (11.4-16.0)  gm/dL


 


Hct   29.1 L   (34.0-46.0)  %


 


MCV   111.1 H   (80.0-100.0)  fL


 


MCH   37.2 H   (25.0-35.0)  pg


 


Plt Count   49 L*   (150-450)  k/uL


 


APTT     (22.0-30.0)  sec


 


BUN    25 H  (7-17)  mg/dL


 


Creatinine    1.50 H  (0.52-1.04)  mg/dL


 


Glucose    102 H  (74-99)  mg/dL


 


Magnesium    0.9 L*  (1.6-2.3)  mg/dL


 


Total Protein    6.0 L  (6.3-8.2)  g/dL


 


Albumin     (3.5-5.0)  g/dL


 


Stool Occult Blood  Positive H    (Negative)  














  18 Range/Units





  19:46 07:22 07:22 


 


RBC   2.38 L   (3.80-5.40)  m/uL


 


Hgb   8.8 L   (11.4-16.0)  gm/dL


 


Hct   27.1 L   (34.0-46.0)  %


 


MCV   113.5 H   (80.0-100.0)  fL


 


MCH   36.9 H   (25.0-35.0)  pg


 


Plt Count   42 L*   (150-450)  k/uL


 


APTT  18.7 L    (22.0-30.0)  sec


 


BUN    22 H  (7-17)  mg/dL


 


Creatinine    1.47 H  (0.52-1.04)  mg/dL


 


Glucose     (74-99)  mg/dL


 


Magnesium     (1.6-2.3)  mg/dL


 


Total Protein    5.5 L  (6.3-8.2)  g/dL


 


Albumin    3.1 L  (3.5-5.0)  g/dL


 


Stool Occult Blood     (Negative)  














Thrombosis Risk Factor Assmnt





- DVT/VTE Prophylaxis


DVT/VTE Prophylaxis: Mechanical Prophylaxis ordered





- Choose All That Apply


Any of the Below Risk Factors Present?: Yes


Each Risk Factor Represents 3 Points: Age 75 years or older


Thrombosis Risk Factor Assessment Total Risk Factor Score: 3


Thrombosis Risk Factor Assessment Level: Moderate Risk





Assessment and Plan


Plan: 





1.  Acute lower GI bleed.  Patient is currently on a clear liquid diet with 

plan for EGD and colonoscopy tomorrow.  Continue Protonix.  Monitor CBC daily.





2.  Chronic anemia of unclear etiology.  Possible component of acute blood 

loss.  Baseline hemoglobin is not known.





3.  Thrombocytopenia which is new for patient.  Consult with Dr. Russell.





4.  Hypomagnesemia status post replacement.  We will check repeat level and 

follow daily.





5.  Glaucoma.  Continue eyedrops.





6.  Hypertension.  Continue Coreg 12.5 mg twice daily, hydrochlorothiazide 12.5 

mg daily, losartan 100 mg daily.





7.  Gout stable.  Continue allopurinol 100 mg daily.





8.  Chronic kidney disease stage 3b.  Continue IV fluids at 75 mL per hour.





9.  DVT prophylaxis.  MARLO hose and SCDs.





10.  GI prophylaxis.  Protonix.





Patient will be admitted to the hospital for a minimum of 2 night stay.


Discharge plan: To be determined


Impression and plan of care have been directed as dictated by the signing 

physician.  Salima Bergeron nurse practitioner acting as scribe for signing 

physician.

## 2018-07-20 LAB
ANION GAP SERPL CALC-SCNC: 5 MMOL/L
BASOPHILS # BLD AUTO: 0 K/UL (ref 0–0.2)
BASOPHILS NFR BLD AUTO: 0 %
BUN SERPL-SCNC: 19 MG/DL (ref 7–17)
CALCIUM SPEC-MCNC: 8.8 MG/DL (ref 8.4–10.2)
CHLORIDE SERPL-SCNC: 108 MMOL/L (ref 98–107)
CO2 SERPL-SCNC: 24 MMOL/L (ref 22–30)
EOSINOPHIL # BLD AUTO: 0 K/UL (ref 0–0.7)
EOSINOPHIL NFR BLD AUTO: 1 %
ERYTHROCYTE [DISTWIDTH] IN BLOOD BY AUTOMATED COUNT: 2.03 M/UL (ref 3.8–5.4)
ERYTHROCYTE [DISTWIDTH] IN BLOOD: 15.2 % (ref 11.5–15.5)
GLUCOSE SERPL-MCNC: 83 MG/DL (ref 74–99)
HCT VFR BLD AUTO: 23.3 % (ref 34–46)
HGB BLD-MCNC: 7.4 GM/DL (ref 11.4–16)
LYMPHOCYTES # SPEC AUTO: 1.5 K/UL (ref 1–4.8)
LYMPHOCYTES NFR SPEC AUTO: 42 %
MAGNESIUM SPEC-SCNC: 1.2 MG/DL (ref 1.6–2.3)
MCH RBC QN AUTO: 36.4 PG (ref 25–35)
MCHC RBC AUTO-ENTMCNC: 31.8 G/DL (ref 31–37)
MCV RBC AUTO: 114.4 FL (ref 80–100)
MONOCYTES # BLD AUTO: 0.2 K/UL (ref 0–1)
MONOCYTES NFR BLD AUTO: 6 %
NEUTROPHILS # BLD AUTO: 1.7 K/UL (ref 1.3–7.7)
NEUTROPHILS NFR BLD AUTO: 48 %
PLATELET # BLD AUTO: 40 K/UL (ref 150–450)
POTASSIUM SERPL-SCNC: 4 MMOL/L (ref 3.5–5.1)
SODIUM SERPL-SCNC: 137 MMOL/L (ref 137–145)
WBC # BLD AUTO: 3.7 K/UL (ref 3.8–10.6)

## 2018-07-20 PROCEDURE — 0DBN8ZX EXCISION OF SIGMOID COLON, VIA NATURAL OR ARTIFICIAL OPENING ENDOSCOPIC, DIAGNOSTIC: ICD-10-PCS

## 2018-07-20 PROCEDURE — 30233N1 TRANSFUSION OF NONAUTOLOGOUS RED BLOOD CELLS INTO PERIPHERAL VEIN, PERCUTANEOUS APPROACH: ICD-10-PCS

## 2018-07-20 PROCEDURE — 0DB78ZX EXCISION OF STOMACH, PYLORUS, VIA NATURAL OR ARTIFICIAL OPENING ENDOSCOPIC, DIAGNOSTIC: ICD-10-PCS

## 2018-07-20 PROCEDURE — 0DBM8ZX EXCISION OF DESCENDING COLON, VIA NATURAL OR ARTIFICIAL OPENING ENDOSCOPIC, DIAGNOSTIC: ICD-10-PCS

## 2018-07-20 RX ADMIN — PANTOPRAZOLE SODIUM SCH MG: 40 INJECTION, POWDER, FOR SOLUTION INTRAVENOUS at 22:54

## 2018-07-20 RX ADMIN — CEFAZOLIN SCH MLS/HR: 330 INJECTION, POWDER, FOR SOLUTION INTRAMUSCULAR; INTRAVENOUS at 16:35

## 2018-07-20 RX ADMIN — DORZOLAMIDE HYDROCHLORIDE SCH DROPS: 20 SOLUTION/ DROPS OPHTHALMIC at 08:24

## 2018-07-20 RX ADMIN — MAGNESIUM SULFATE IN DEXTROSE SCH MLS/HR: 10 INJECTION, SOLUTION INTRAVENOUS at 18:17

## 2018-07-20 RX ADMIN — DORZOLAMIDE HYDROCHLORIDE SCH DROPS: 20 SOLUTION/ DROPS OPHTHALMIC at 22:53

## 2018-07-20 RX ADMIN — PANTOPRAZOLE SODIUM SCH MG: 40 INJECTION, POWDER, FOR SOLUTION INTRAVENOUS at 08:24

## 2018-07-20 RX ADMIN — CARVEDILOL SCH MG: 12.5 TABLET, FILM COATED ORAL at 08:24

## 2018-07-20 RX ADMIN — TIMOLOL MALEATE SCH DROPS: 5 SOLUTION OPHTHALMIC at 08:25

## 2018-07-20 RX ADMIN — MAGNESIUM SULFATE IN DEXTROSE SCH MLS/HR: 10 INJECTION, SOLUTION INTRAVENOUS at 16:35

## 2018-07-20 RX ADMIN — HYDROCHLOROTHIAZIDE SCH: 12.5 CAPSULE ORAL at 10:23

## 2018-07-20 RX ADMIN — LOSARTAN POTASSIUM SCH: 50 TABLET, FILM COATED ORAL at 10:22

## 2018-07-20 RX ADMIN — POTASSIUM CHLORIDE SCH: 14.9 INJECTION, SOLUTION INTRAVENOUS at 16:49

## 2018-07-20 RX ADMIN — CARVEDILOL SCH MG: 12.5 TABLET, FILM COATED ORAL at 16:40

## 2018-07-20 RX ADMIN — ALLOPURINOL SCH MG: 100 TABLET ORAL at 08:24

## 2018-07-20 RX ADMIN — MAGNESIUM SULFATE IN DEXTROSE SCH MLS/HR: 10 INJECTION, SOLUTION INTRAVENOUS at 20:40

## 2018-07-20 RX ADMIN — CEFAZOLIN SCH MLS/HR: 330 INJECTION, POWDER, FOR SOLUTION INTRAMUSCULAR; INTRAVENOUS at 06:25

## 2018-07-20 NOTE — P.CONS
History of Present Illness





- Reason for Consult


Consult date: 18


Anemia and thrombocytopenia, macrocytosis





- History of Present Illness





  The patient is an 84-year-old white female, with multiple medical issues.  

The patient states that she has been anemic since at least the beginning of 

this year.  According to her she was told at that time that she had B12 

deficiency.  She received B12 injection in 's office, initially and was 

then placed on B12 orally.  The day prior to her admission, she started having 

passage of bright red blood per rectum.  As discontinued, she came into the 

emergency room.  On admission hemoglobin was actually 9.4, platelets of 49.  

MCV was significantly elevated, and greater than 110.  The patient then drop 

her hemoglobin to 7.4 today with platelets being 40.  She underwent a 

colonoscopy today with platelet transfusion, showing evidence of segmental 

colitis in the rectosigmoid, suggestive of ischemic colitis.  Biopsies were 

performed.


  Consult was placed for further evaluation and recommendations.


   The patient states that in  or , she was admitted with a bleeding ulcer 

and had surgery.  She thinks that she may have had a part of her stomach 

resected as part of the surgery.





Review of Systems


Constitutional: Reports poor appetite (Over the last 2 weeks), Reports weakness

, Reports weight loss (10 pounds)


Eyes: denies blurred vision, denies pain


Ears: deny: decreased hearing, ear discharge, earache, tinnitus


Ears, nose, mouth and throat: Denies headache, Denies sore throat


Cardiovascular: Reports decreased exercise tolerance


Respiratory: Denies cough


Gastrointestinal: Reports abdominal pain, Reports hematochezia


Genitourinary: Reports as per HPI


Menstruation: Reports postmenopausal


Musculoskeletal: Reports muscle weakness


Integumentary: Denies pruritus, Denies rash


Neurological: Reports weakness


Psychiatric: Denies anxiety, Denies depression


Endocrine: Denies fatigue, Denies weight change


Hematologic/Lymphatic: Reports as per HPI





Past Medical History


Past Medical History: Hypertension


Additional Past Medical History / Comment(s): glaucoma, gout, anemia


History of Any Multi-Drug Resistant Organisms: None Reported


Past Surgical History: Joint Replacement, Orthopedic Surgery


Additional Past Surgical History / Comment(s): Partial right hip replacement, 

bilateral cataract removal and intraocular lens implants


Past Psychological History: No Psychological Hx Reported


Smoking Status: Former smoker


Past Alcohol Use History: None Reported


Past Drug Use History: None Reported





- Past Family History


  ** Father


Additional Family Medical History / Comment(s): Father  in his 60s





  ** Mother


Additional Family Medical History / Comment(s): Mother  in her 90s from old 

age.  Patient has 1 sister with no major medical problems.  Patient does not 

have any brothers or children.





Medications and Allergies


 Home Medications











 Medication  Instructions  Recorded  Confirmed  Type


 


Allopurinol [Zyloprim] 100 mg PO DAILY 18 History


 


Carvedilol [Coreg] 12.5 mg PO BID 18 History


 


Losartan/Hydrochlorothiazide 1 tab PO DAILY 18 History





[Losartan-Hctz 100-12.5 mg Tab]    


 


Travon/D3/Mag11/Zinc//Alex/Bor 1 tab PO DAILY 18 History





[Caltrate 600+D Plus Tablet]    


 


Calcitriol [Rocaltrol] 0.25 mcg PO DAILY 18 History


 


Dorzolamide/Timolol/Pf [Cosopt Pf 1 drop RIGHT EYE BID 18 History





2%/5% Ophth Droperette]    


 


Latanoprost Ophth [Xalatan 0.005%] 1 drop BOTH EYES HS 18 History


 


Ranitidine HCl [Zantac] 150 mg PO HS 18 History











 Allergies











Allergy/AdvReac Type Severity Reaction Status Date / Time


 


No Known Allergies Allergy   Verified 18 20:28














Physical Exam


Vitals: 


 Vital Signs











  Temp Pulse Pulse Resp BP BP Pulse Ox


 


 18 16:44       109/52 


 


 18 15:53    73  18   84/46  98


 


 18 14:36  97.2 F L  68   16  112/60  


 


 18 14:27  97.2 F L   67  16   114/61  94 L


 


 18 12:16  97.6 F  66   16  111/57  


 


 18 12:15  97.6 F  66   16  111/57  


 


 18 11:46  97.2 F L  68   16  111/59   93 L


 


 18 11:36  97.5 F L  71   16  118/63  


 


 18 10:22    68  17   101/52  99


 


 18 06:28  97.7 F   84  17   100/53  98


 


 18 23:00    79  17   94/46  98








 Intake and Output











 18





 06:59 14:59 22:59


 


Intake Total  719 


 


Balance  719 


 


Intake:   


 


  IV  300 


 


  Blood Product  419 


 


    Platelet Pheresis Acda  419 





    Unit [id number]   


 


Other:   


 


  # Voids 3 3 


 


  # Bowel Movements  3 














- Constitutional


General appearance: no acute distress





- EENT


Eyes: EOMI, PERRLA


ENT: hearing grossly normal, normal oropharynx





- Neck


Neck: no lymphadenopathy


Thyroid: bilateral: normal size





- Respiratory


Respiratory: bilateral: CTA





- Cardiovascular


Rhythm: regular


Heart sounds: normal: S1, S2





- Gastrointestinal


General gastrointestinal: normal bowel sounds, soft





- Integumentary


Integumentary: normal





- Neurologic


Neurologic: CNII-XII intact





- Musculoskeletal


Musculoskeletal: generalized weakness, strength equal bilaterally





- Psychiatric


Psychiatric: A&O x's 3, appropriate affect, intact judgment & insight





Results


CBC & Chem 7: 


 18 07:57





 18 07:57


Labs: 


 Abnormal Lab Results - Last 24 Hours (Table)











  18 Range/Units





  07:57 07:57 


 


WBC   3.7 L  (3.8-10.6)  k/uL


 


RBC   2.03 L  (3.80-5.40)  m/uL


 


Hgb   7.4 L  (11.4-16.0)  gm/dL


 


Hct   23.3 L  (34.0-46.0)  %


 


MCV   114.4 H  (80.0-100.0)  fL


 


MCH   36.4 H  (25.0-35.0)  pg


 


Plt Count   40 L*  (150-450)  k/uL


 


Chloride  108 H   ()  mmol/L


 


BUN  19 H   (7-17)  mg/dL


 


Creatinine  1.47 H   (0.52-1.04)  mg/dL


 


Magnesium  1.2 L   (1.6-2.3)  mg/dL











Abdominal x-ray: report reviewed





Assessment and Plan


(1) Bicytopenia


Narrative/Plan: 


  The patient is presenting with low hemoglobin and platelets.  Today actually 

the white blood cells were also low, though this is likely dilutional from 

platelet transfusion.  The patient does have a history of bleeding per rectum 

but that is fairly acute.  Bleeding due to ischemic colitis is unusual to cause 

overt anemia.  This should not usually affects her platelet counts.  In 

addition her MCV is markedly elevated, which is not consistent with anemia of 

chronic blood loss.  This raises the possibility of a primary marrow disorder.  

B12 deficiency could also present in the same way.  The patient could be at 

risk of nutritional deficiency from diminished absorption if she has had 

partial gastrectomy previously.


- I will do a full cytopenia workup, including iron studies, B12/folate studies

, reticulocyte count, haptoglobin, and protein electrophoresis studies.  

Further recommendations will be made accordingly.  If the patient is found to 

have a deficiency state, she will likely need parenteral supplementation.





- While testing is pending, continue to monitor counts and supplement if 

needed.  Transfuse to keep hemoglobin above 7, and platelets above 10 unless 

actively bleeding.


Current Visit: Yes   Status: Acute   Code(s): D75.89 - OTHER SPECIFIED DISEASES 

OF BLOOD AND BLOOD-FORMING ORGANS   SNOMED Code(s): 045359698


   


Plan: 





Defer to the admitting service for management of her other medical problems

## 2018-07-20 NOTE — P.PN
Subjective


Progress Note Date: 07/20/18





This is an 84-year-old  female patient of Dr. Sheldon. with past history 

of hypertension, glaucoma, gout, anemia.  Patient states the last time she had 

blood work done with Dr. Sheldon, he told her that she was anemic.  Patient has 

had colonoscopies in the past with Dr. Kinsey on several occasions and she 

denies any problems with those in the past.  She states that yesterday she had 

bright red bleeding in the toilet.  She denies any maroon color to her stools.  

She states she has had this 9-10 years ago which time she had vomiting and 

bloody stool secondary to a perforated ulcer.  Patient denies having any 

abdominal pain but states her abdomen just doesn't feel right.  She denies any 

lightheadedness.  Patient came into [facility] emergency 

center for evaluation.  She was found to have a hemoglobin of 9.8, platelet 

count is currently 42, INR 1.1, creatinine 1.5.  Hemoccult blood was positive.  

[facility] and consult with GI has been 

requested.  Magnesium was also low at 0.8 and patient was given 2 g of 

magnesium sulfate.





7/20: Repeat lab work shows pancytopenia with hemoglobin of 7.4, WBC of 3.7 and 

a platelet count of 40.  Consult with Dr. Russell is pending.  BUN is 19 and 

creatinine 1.47.  Repeat magnesium is 1.2 which will be replaced. EGD with 

biopsy revealed large hiatal hernia, gastritis of the prepyloric area.  

Colonoscopy revealed segmental colitis of the sigmoid and descending colon 

consistent with acute ischemic colitis status post multiple biopsies.  

Scattered sigmoid diverticulosis.  Diet is to be advanced as tolerated per Dr. Griffiths.











Objective





- Vital Signs


Vital signs: 


 Vital Signs











Temp  97.7 F   07/20/18 06:28


 


Pulse  68   07/20/18 10:22


 


Resp  17   07/20/18 10:22


 


BP  101/52   07/20/18 10:22


 


Pulse Ox  99   07/20/18 10:22








 Intake & Output











 07/19/18 07/20/18 07/20/18





 18:59 06:59 18:59


 


Weight 66.088 kg  


 


Other:   


 


  # Voids 4 3 


 


  # Bowel Movements 1  














- Exam





Gen: This is an 84-year-old  female.  She is sitting up in bed and 

appears to be comfortable and in no acute distress.


HEENT: Head is atraumatic, normocephalic. Pupils equal, round. Sclerae is 

anicteric. 


NECK: Supple. No JVD. No lymphadenopathy. No thyromegaly. 


LUNGS: Clear to auscultation. No wheezes or rhonchi.  No intercostal 

retractions.


HEART: Regular rate and rhythm. No murmur. 


ABDOMEN: Soft. Bowel sounds are present. No masses.  No tenderness.


EXTREMITIES: No pedal edema.  No calf tenderness.


NEUROLOGICAL: Patient is awake, alert and oriented x3. Cranial nerves 2 through 

12 are grossly intact. 











- Labs


CBC & Chem 7: 


 07/20/18 07:57





 07/20/18 07:57


Labs: 


 Abnormal Lab Results - Last 24 Hours (Table)











  07/19/18 07/20/18 07/20/18 Range/Units





  07:22 07:57 07:57 


 


WBC    3.7 L  (3.8-10.6)  k/uL


 


RBC  2.38 L   2.03 L  (3.80-5.40)  m/uL


 


Hgb  8.8 L   7.4 L  (11.4-16.0)  gm/dL


 


Hct  27.1 L   23.3 L  (34.0-46.0)  %


 


MCV  113.5 H   114.4 H  (80.0-100.0)  fL


 


MCH  36.9 H   36.4 H  (25.0-35.0)  pg


 


Plt Count  42 L*   40 L*  (150-450)  k/uL


 


Chloride   108 H   ()  mmol/L


 


BUN   19 H   (7-17)  mg/dL


 


Creatinine   1.47 H   (0.52-1.04)  mg/dL


 


Magnesium   1.2 L   (1.6-2.3)  mg/dL














Assessment and Plan


Plan: 





1.  Acute lower GI bleed secondary to acute ischemic colitis and also 

gastritis.  Status post EGD and colonoscopy.  Continue Protonix.  Monitor CBC 

daily.





2.  Chronic anemia of unclear etiology.  Possible component of acute blood 

loss.  Baseline hemoglobin is not known.





3.  Thrombocytopenia and now pancytopenic which is new for patient.  Consult 

with Dr. Russell.





4.  Hypomagnesemia status post replacement.  We will check repeat level and 

follow daily.





5.  Glaucoma.  Continue eyedrops.





6.  Hypertension.  Continue Coreg 12.5 mg twice daily, hydrochlorothiazide 12.5 

mg daily, losartan 100 mg daily.





7.  Gout stable.  Continue allopurinol 100 mg daily.





8.  Chronic kidney disease stage 3b.  Continue IV fluids at 75 mL per hour.





9.  DVT prophylaxis.  MARLO hose and SCDs.





10.  GI prophylaxis.  Protonix.





Discharge plan: Return to [facility]


Impression and plan of care have been directed as dictated by the signing 

physician.  Salima Bergeron nurse practitioner acting as scribe for signing 

physician.

## 2018-07-20 NOTE — P.PCN
Date of Procedure: 07/20/18


Procedure(s) Performed: 


Brief history:


Patient is a pleasant 84-year-old white female, admitted to the hospital with 

acute GI bleed.  She had couple of episodes of bright red blood per rectum and 

drop in hemoglobin from 9-7.5 g/dL.  She is hence scheduled for an elective 

upper endoscopy as well as colonoscopy as a part of evaluation of of acute GI 

bleed.





Procedure performed:


Esophagogastroduodenoscopy with biopsy


Colonoscopy with biopsy





Preoperative diagnosis:


Acute GI bleed.





Anesthesia: MAC





Procedure:


After informed consent was obtained from the patient  was brought into the 

endoscopy unit and IV  sedation was administered by anesthesia under continuous 

monitoring.  Initially upper endoscopy was done.  The Olympus  video 

endoscope was inserted inserted into the mouth and esophagus intubated without 

any difficulty and was gradually advanced into the stomach and duodenum and 

carefully examined.  The bulb and second part of the duodenum appeared normal.  

The scope was then withdrawn into the stomach adequately insufflated with air 

and upon careful examination there was evidence of distal antrectomy noted.  

There was mild gastritis noted in the antrum of the stomach and biopsies were 

done from this area.  The body, cardia and fundus appeared normal.  The scope 

was then withdrawn into the esophagus. moderate large size hiatal hernia noted.

  The GE junction was located at 31 cm to the incisors.  It appeared regular 

with no erythema erosions or ulcerations.  Rest of the esophagus appeared 

normal.  Patient tolerated the procedure well.





At this time the patient continued to remain sedation.  Initial digital rectal 

examination was normal.  Olympus  video colonoscope was then inserted 

into the rectum and gradually advanced to the cecum without any difficulty.  

Careful examination was performed as the scope was gradually being withdrawn.  

The prep was excellent.  The cecum, ascending colon, transverse colon,  

appeared normal.  There was acute colitis with mucosal erythema and friability 

noted in the descending colon, sigmoid colon extending from 20-50 cm from the 

anal verge consistent with ischemic colitis and biopsies were done from this 

area.  The  rectum appeared normal.  Katter sigmoid diverticula seen.   

Retroflexion was performed in the rectum and no lesions were noted.  Patient 

tolerated the procedure well.





Impression:


1.  Upper endoscopy revealed moderate to large size hiatal hernia and gastritis 

of the  prepyloric area.


2.  Colonoscopy revealed segmental colitis of the sigmoid and descending colon 

consistent with acute ischemic colitis status post multiple biopsies.  

Scattered sigmoid diverticulosis





Recommendations:


Findings of this examination were discussed with the patient.  At this time 

will await the biopsy results.  Diet will be advanced as tolerated.

## 2018-07-21 LAB
ANION GAP SERPL CALC-SCNC: 5 MMOL/L
BUN SERPL-SCNC: 15 MG/DL (ref 7–17)
CALCIUM SPEC-MCNC: 9 MG/DL (ref 8.4–10.2)
CHLORIDE SERPL-SCNC: 108 MMOL/L (ref 98–107)
CO2 SERPL-SCNC: 26 MMOL/L (ref 22–30)
ERYTHROCYTE [DISTWIDTH] IN BLOOD BY AUTOMATED COUNT: 2.02 M/UL (ref 3.8–5.4)
ERYTHROCYTE [DISTWIDTH] IN BLOOD: 15 % (ref 11.5–15.5)
GLUCOSE SERPL-MCNC: 99 MG/DL (ref 74–99)
HCT VFR BLD AUTO: 23.2 % (ref 34–46)
HGB BLD-MCNC: 7.7 GM/DL (ref 11.4–16)
MAGNESIUM SPEC-SCNC: 2 MG/DL (ref 1.6–2.3)
MCH RBC QN AUTO: 38.3 PG (ref 25–35)
MCHC RBC AUTO-ENTMCNC: 33.3 G/DL (ref 31–37)
MCV RBC AUTO: 114.9 FL (ref 80–100)
PLATELET # BLD AUTO: 80 K/UL (ref 150–450)
POTASSIUM SERPL-SCNC: 3.5 MMOL/L (ref 3.5–5.1)
SODIUM SERPL-SCNC: 139 MMOL/L (ref 137–145)
VIT B12 SERPL-MCNC: 1648 PG/ML (ref 200–944)
WBC # BLD AUTO: 3.4 K/UL (ref 3.8–10.6)

## 2018-07-21 RX ADMIN — CARVEDILOL SCH MG: 12.5 TABLET, FILM COATED ORAL at 17:42

## 2018-07-21 RX ADMIN — ALLOPURINOL SCH MG: 100 TABLET ORAL at 08:30

## 2018-07-21 RX ADMIN — POTASSIUM CHLORIDE SCH: 14.9 INJECTION, SOLUTION INTRAVENOUS at 17:42

## 2018-07-21 RX ADMIN — DORZOLAMIDE HYDROCHLORIDE SCH DROPS: 20 SOLUTION/ DROPS OPHTHALMIC at 08:30

## 2018-07-21 RX ADMIN — TIMOLOL MALEATE SCH DROPS: 5 SOLUTION OPHTHALMIC at 08:30

## 2018-07-21 RX ADMIN — HYDROCHLOROTHIAZIDE SCH MG: 12.5 CAPSULE ORAL at 08:30

## 2018-07-21 RX ADMIN — CARVEDILOL SCH MG: 12.5 TABLET, FILM COATED ORAL at 08:30

## 2018-07-21 RX ADMIN — PANTOPRAZOLE SODIUM SCH MG: 40 INJECTION, POWDER, FOR SOLUTION INTRAVENOUS at 08:30

## 2018-07-21 RX ADMIN — CEFAZOLIN SCH MLS/HR: 330 INJECTION, POWDER, FOR SOLUTION INTRAMUSCULAR; INTRAVENOUS at 05:32

## 2018-07-21 RX ADMIN — DORZOLAMIDE HYDROCHLORIDE SCH DROPS: 20 SOLUTION/ DROPS OPHTHALMIC at 22:06

## 2018-07-21 RX ADMIN — LOSARTAN POTASSIUM SCH MG: 50 TABLET, FILM COATED ORAL at 08:29

## 2018-07-21 RX ADMIN — PANTOPRAZOLE SODIUM SCH MG: 40 INJECTION, POWDER, FOR SOLUTION INTRAVENOUS at 22:08

## 2018-07-21 NOTE — P.PN
Subjective


Progress Note Date: 07/21/18





This is an 84-year-old  female patient of Dr. Sheldon. with past history 

of hypertension, glaucoma, gout, anemia.  Patient states the last time she had 

blood work done with Dr. Sheldon, he told her that she was anemic.  Patient has 

had colonoscopies in the past with Dr. Kinsey on several occasions and she 

denies any problems with those in the past.  She states that yesterday she had 

bright red bleeding in the toilet.  She denies any maroon color to her stools.  

She states she has had this 9-10 years ago which time she had vomiting and 

bloody stool secondary to a perforated ulcer.  Patient denies having any 

abdominal pain but states her abdomen just doesn't feel right.  She denies any 

lightheadedness.  Patient came into [facility] emergency 

center for evaluation.  She was found to have a hemoglobin of 9.8, platelet 

count is currently 42, INR 1.1, creatinine 1.5.  Hemoccult blood was positive.  

Patient has been admitted to the [facility] floor and consult with GI has been 

requested.  Magnesium was also low at 0.8 and patient was given 2 g of 

magnesium sulfate.





7/20: Repeat lab work shows pancytopenia with hemoglobin of 7.4, WBC of 3.7 and 

a platelet count of 40.  Consult with Dr. Russell is pending.  BUN is 19 and 

creatinine 1.47.  Repeat magnesium is 1.2 which will be replaced. EGD with 

biopsy revealed large hiatal hernia, gastritis of the prepyloric area.  

Colonoscopy revealed segmental colitis of the sigmoid and descending colon 

consistent with acute ischemic colitis status post multiple biopsies.  

Scattered sigmoid diverticulosis.  Diet is to be advanced as tolerated per Dr. Griffiths.





7/21: Patient underwent EGD that showed mild gastritis, large hiatal hernia.  

Colonoscopy revealed segmental colitis in the sigmoid and descending colon with 

possible ischemic colitis with biopsies obtained.  Scattered sigmoid 

diverticulosis.  We have ordered an MRA of the abdomen to further assess.  

Hemoglobin today at 7.7, white count is 3.4 and platelet count 80.  Creatinine 

is 1.36.  Patient has been seen by Dr. Russell.  She has been seen by Dr. Solorzano in 

the past as she was sent there by Dr. Sheldon.  Patient does state that she is 

feeling better today. 











Objective





- Vital Signs


Vital signs: 


 Vital Signs











Temp  97.5 F L  07/21/18 06:28


 


Pulse  66   07/21/18 06:28


 


Resp  18   07/21/18 06:28


 


BP  116/62   07/21/18 06:28


 


Pulse Ox  94 L  07/21/18 06:28








 Intake & Output











 07/20/18 07/21/18 07/21/18





 18:59 06:59 18:59


 


Intake Total 719 615 


 


Balance 719 615 


 


Intake:   


 


    


 


  Oral  615 


 


  Blood Product 419  


 


    Platelet Pheresis Acda 419  





    Unit [id number]   


 


Other:   


 


  Voiding Method  Toilet 


 


  # Voids 3 2 


 


  # Bowel Movements 3  














- Exam





Gen: This is an 84-year-old  female.  She is sitting up in bed and 

appears to be comfortable and in no acute distress.


HEENT: Head is atraumatic, normocephalic. Pupils equal, round. Sclerae is 

anicteric. 


NECK: Supple. No JVD. No lymphadenopathy. No thyromegaly. 


LUNGS: Clear to auscultation. No wheezes or rhonchi.  No intercostal 

retractions.


HEART: Regular rate and rhythm. No murmur. 


ABDOMEN: Soft. Bowel sounds are present. No masses.  No tenderness.


EXTREMITIES: No pedal edema.  No calf tenderness.


NEUROLOGICAL: Patient is awake, alert and oriented x3. Cranial nerves 2 through 

12 are grossly intact. 











- Labs


CBC & Chem 7: 


 07/21/18 08:07





 07/21/18 08:07


Labs: 


 Abnormal Lab Results - Last 24 Hours (Table)











  07/21/18 07/21/18 Range/Units





  08:07 08:07 


 


WBC   3.4 L  (3.8-10.6)  k/uL


 


RBC   2.02 L  (3.80-5.40)  m/uL


 


Hgb   7.7 L  (11.4-16.0)  gm/dL


 


Hct   23.2 L  (34.0-46.0)  %


 


MCV   114.9 H  (80.0-100.0)  fL


 


MCH   38.3 H  (25.0-35.0)  pg


 


Plt Count   80 L D  (150-450)  k/uL


 


Chloride  108 H   ()  mmol/L


 


Creatinine  1.36 H   (0.52-1.04)  mg/dL














Assessment and Plan


Plan: 





1.  Acute lower GI bleed secondary to acute ischemic colitis and also 

gastritis.  Status post EGD and colonoscopy.  Continue Protonix.  Monitor CBC 

daily.





2.  Chronic anemia of unclear etiology.  Possible component of acute blood 

loss.  Baseline hemoglobin is not known.





3.  Thrombocytopenia and now pancytopenic which is new for patient.  Consult 

with Dr. Russell.  Cytopenia workup in process.





4.  Hypomagnesemia status post replacement.  We will check repeat level and 

follow daily.





5.  Glaucoma.  Continue eyedrops.





6.  Hypertension.  Continue Coreg 12.5 mg twice daily, hydrochlorothiazide 12.5 

mg daily, losartan 100 mg daily.





7.  Gout stable.  Continue allopurinol 100 mg daily.





8.  Chronic kidney disease stage 3b.  Continue IV fluids at 75 mL per hour.





9.  DVT prophylaxis.  MARLO hose and SCDs.





10.  GI prophylaxis.  Protonix.





Discharge plan: Return to [facility] most likely by Monday


Impression and plan of care have been directed as dictated by the signing 

physician.  Salima Bergeron nurse practitioner acting as scribe for signing 

physician.

## 2018-07-21 NOTE — PN
PROGRESS NOTE



DATE OF SERVICE:

07/21/18





The patient is an 84-year-old pleasant white female admitted to the hospital with acute

GI bleed.  She underwent an upper endoscopy as well as colonoscopy yesterday which

showed evidence of left-sided colitis, possibly ischemic in etiology.  The patient is

doing better.  She is on a regular diet, tolerating well.  No abdominal pain.  No

further episodes of bleeding.



PHYSICAL EXAMINATION:

Appears comfortable in no apparent distress. Vital signs stable. Blood pressure 107/57.

Pulse rate 67. Temperature 98.7. HEENT examination unremarkable.  Conjunctivae pink.

Sclerae anicteric. Oral cavity no lesions. Neck no jugular venous distention or lymph

node enlargement.  The chest was clear to auscultation.  Heart regular rate and rhythm.

Abdomen soft, bowel sounds positive. No organomegaly.  Extremities:  No pedal edema.

SKIN: No rashes.  Neurological: alert and oriented times three.  No focal deficits.



LABS:

From today WBC 3.4, hemoglobin 7.7, and platelets are 80,000.  BUN and creatinine are

within normal limits.



IMPRESSION:

1. Acute gastrointestinal bleed status post EGD and colonoscopy yesterday that

    revealed mild gastritis and left-sided segmental colitis, possibly ischemic in

    nature, status post biopsy.  The patient asymptomatic. The bleeding has resolved.

2. Thrombocytopenia for which hematology following the patient closely.



RECOMMENDATIONS:

1. Continue with regular diet.

2. Since the bleeding has resolved, she can be discharged home with outpatient follow

    up in 2 weeks.

Thank you for this consultation.





GRACIE / FARRAHN: [id number] / Job#: [id number]

## 2018-07-22 LAB
ANION GAP SERPL CALC-SCNC: 7 MMOL/L
BUN SERPL-SCNC: 15 MG/DL (ref 7–17)
CALCIUM SPEC-MCNC: 9.2 MG/DL (ref 8.4–10.2)
CHLORIDE SERPL-SCNC: 108 MMOL/L (ref 98–107)
CO2 SERPL-SCNC: 24 MMOL/L (ref 22–30)
ERYTHROCYTE [DISTWIDTH] IN BLOOD BY AUTOMATED COUNT: 2.15 M/UL (ref 3.8–5.4)
ERYTHROCYTE [DISTWIDTH] IN BLOOD: 14.7 % (ref 11.5–15.5)
GLUCOSE SERPL-MCNC: 110 MG/DL (ref 74–99)
HCT VFR BLD AUTO: 24.3 % (ref 34–46)
HGB BLD-MCNC: 7.9 GM/DL (ref 11.4–16)
MCH RBC QN AUTO: 36.9 PG (ref 25–35)
MCHC RBC AUTO-ENTMCNC: 32.6 G/DL (ref 31–37)
MCV RBC AUTO: 113.1 FL (ref 80–100)
PLATELET # BLD AUTO: 63 K/UL (ref 150–450)
POTASSIUM SERPL-SCNC: 3.8 MMOL/L (ref 3.5–5.1)
SODIUM SERPL-SCNC: 139 MMOL/L (ref 137–145)
WBC # BLD AUTO: 2.6 K/UL (ref 3.8–10.6)

## 2018-07-22 RX ADMIN — HYDROCHLOROTHIAZIDE SCH MG: 12.5 CAPSULE ORAL at 08:46

## 2018-07-22 RX ADMIN — CARVEDILOL SCH MG: 12.5 TABLET, FILM COATED ORAL at 08:45

## 2018-07-22 RX ADMIN — LOSARTAN POTASSIUM SCH MG: 50 TABLET, FILM COATED ORAL at 08:45

## 2018-07-22 RX ADMIN — TIMOLOL MALEATE SCH DROPS: 5 SOLUTION OPHTHALMIC at 08:46

## 2018-07-22 RX ADMIN — DORZOLAMIDE HYDROCHLORIDE SCH DROPS: 20 SOLUTION/ DROPS OPHTHALMIC at 08:47

## 2018-07-22 RX ADMIN — ALLOPURINOL SCH MG: 100 TABLET ORAL at 08:45

## 2018-07-22 RX ADMIN — DORZOLAMIDE HYDROCHLORIDE SCH DROPS: 20 SOLUTION/ DROPS OPHTHALMIC at 20:51

## 2018-07-22 RX ADMIN — POTASSIUM CHLORIDE SCH: 14.9 INJECTION, SOLUTION INTRAVENOUS at 18:12

## 2018-07-22 RX ADMIN — PANTOPRAZOLE SODIUM SCH MG: 40 INJECTION, POWDER, FOR SOLUTION INTRAVENOUS at 08:45

## 2018-07-22 RX ADMIN — CARVEDILOL SCH MG: 12.5 TABLET, FILM COATED ORAL at 18:12

## 2018-07-22 RX ADMIN — PANTOPRAZOLE SODIUM SCH MG: 40 INJECTION, POWDER, FOR SOLUTION INTRAVENOUS at 20:51

## 2018-07-22 NOTE — P.PN
Subjective


Progress Note Date: 07/22/18





This is an 84-year-old  female patient of Dr. Sheldon. with past history 

of hypertension, glaucoma, gout, anemia.  Patient states the last time she had 

blood work done with Dr. Sheldon, he told her that she was anemic.  Patient has 

had colonoscopies in the past with Dr. Kinsey on several occasions and she 

denies any problems with those in the past.  She states that yesterday she had 

bright red bleeding in the toilet.  She denies any maroon color to her stools.  

She states she has had this 9-10 years ago which time she had vomiting and 

bloody stool secondary to a perforated ulcer.  Patient denies having any 

abdominal pain but states her abdomen just doesn't feel right.  She denies any 

lightheadedness.  Patient came into [facility] emergency 

center for evaluation.  She was found to have a hemoglobin of 9.8, platelet 

count is currently 42, INR 1.1, creatinine 1.5.  Hemoccult blood was positive.  

Patient has been admitted to the [facility] floor and consult with GI has been 

requested.  Magnesium was also low at 0.8 and patient was given 2 g of 

magnesium sulfate.





7/20: Repeat lab work shows pancytopenia with hemoglobin of 7.4, WBC of 3.7 and 

a platelet count of 40.  Consult with Dr. Russell is pending.  BUN is 19 and 

creatinine 1.47.  Repeat magnesium is 1.2 which will be replaced. EGD with 

biopsy revealed large hiatal hernia, gastritis of the prepyloric area.  

Colonoscopy revealed segmental colitis of the sigmoid and descending colon 

consistent with acute ischemic colitis status post multiple biopsies.  

Scattered sigmoid diverticulosis.  Diet is to be advanced as tolerated per Dr. Griffiths.





7/21: Patient underwent EGD that showed mild gastritis, large hiatal hernia.  

Colonoscopy revealed segmental colitis in the sigmoid and descending colon with 

possible ischemic colitis with biopsies obtained.  Scattered sigmoid 

diverticulosis.  We have ordered an MRA of the abdomen to further assess.  

Hemoglobin today at 7.7, white count is 3.4 and platelet count 80.  Creatinine 

is 1.36.  Patient has been seen by Dr. Russell.  She has been seen by Dr. Solorzano in 

the past as she was sent there by Dr. Sheldon.  Patient does state that she is 

feeling better today. 





7/22: Patient states that her appetite is okay.  She ate breakfast and lunch 

yesterday but not dinner.  She denies having any nausea.  Hemoglobin today is 

at 7.9, white count 2.6 and platelet count 63.  Creatinine is 1.30.  Dr. Russell he 

is ordered extensive lab work.  We're waiting for MRA which possibly will 

happen tomorrow.  Anticipate discharge possibly by tomorrow.











Objective





- Vital Signs


Vital signs: 


 Vital Signs











Temp  96.8 F L  07/22/18 06:13


 


Pulse  61   07/22/18 06:13


 


Resp  19   07/22/18 06:13


 


BP  128/69   07/22/18 06:13


 


Pulse Ox  94 L  07/22/18 06:13








 Intake & Output











 07/21/18 07/22/18 07/22/18





 18:59 06:59 18:59


 


Other:   


 


  Voiding Method  Bedside Commode 


 


  # Voids 3 2 


 


  # Bowel Movements 1  














- Exam





Gen: This is an 84-year-old  female.  She is sitting up in bed and 

appears to be comfortable and in no acute distress.


HEENT: Head is atraumatic, normocephalic. Pupils equal, round. Sclerae is 

anicteric. 


NECK: Supple. No JVD. No lymphadenopathy. No thyromegaly. 


LUNGS: Clear to auscultation. No wheezes or rhonchi.  No intercostal 

retractions.


HEART: Regular rate and rhythm. No murmur. 


ABDOMEN: Soft. Bowel sounds are present. No masses.  No tenderness.


EXTREMITIES: No pedal edema.  No calf tenderness.


NEUROLOGICAL: Patient is awake, alert and oriented x3. Cranial nerves 2 through 

12 are grossly intact. 











- Labs


CBC & Chem 7: 


 07/22/18 08:28





 07/22/18 08:28


Labs: 


 Abnormal Lab Results - Last 24 Hours (Table)











  07/21/18 07/21/18 Range/Units





  08:07 08:07 


 


WBC   3.4 L  (3.8-10.6)  k/uL


 


RBC   2.02 L  (3.80-5.40)  m/uL


 


Hgb   7.7 L  (11.4-16.0)  gm/dL


 


Hct   23.2 L  (34.0-46.0)  %


 


MCV   114.9 H  (80.0-100.0)  fL


 


MCH   38.3 H  (25.0-35.0)  pg


 


Plt Count   80 L D  (150-450)  k/uL


 


Chloride  108 H   ()  mmol/L


 


Creatinine  1.36 H   (0.52-1.04)  mg/dL














Assessment and Plan


Plan: 





1.  Acute lower GI bleed secondary to acute ischemic colitis and also 

gastritis.  Status post EGD and colonoscopy.  Continue Protonix.  Monitor CBC 

daily.  MRA has been ordered of the abdomen.





2.  Chronic anemia of unclear etiology.  Possible component of acute blood 

loss.  Baseline hemoglobin is not known.





3.  Thrombocytopenia and now pancytopenic which is new for patient.  Consult 

with Dr. Russell.  Cytopenia workup in process.





4.  Hypomagnesemia status post replacement.  We will check repeat level and 

follow daily.





5.  Glaucoma.  Continue eyedrops.





6.  Hypertension.  Continue Coreg 12.5 mg twice daily, hydrochlorothiazide 12.5 

mg daily, losartan 100 mg daily.





7.  Gout stable.  Continue allopurinol 100 mg daily.





8.  Chronic kidney disease stage 3b.  Continue IV fluids at 75 mL per hour.





9.  DVT prophylaxis.  MARLO hose and SCDs.





10.  GI prophylaxis.  Protonix.





Discharge plan: Return to [facility] most likely by Monday


Impression and plan of care have been directed as dictated by the signing 

physician.  Salima Bergeron nurse practitioner acting as scribe for signing 

physician.

## 2018-07-22 NOTE — PN
PROGRESS NOTE



DATE OF SERVICE:

July 22, 2018



Patient is an 84-year-old pleasant white female admitted to the hospital with lower GI

bleed.  She had an EGD colonoscopy done by me 2 days ago that showed evidence of

segmental colitis involving the left colon, possibly ischemic in nature.  Biopsies are

still pending.  Patient is doing better today.  She is on a regular diet, tolerating

well.  No abdominal pain.  She had 2 bowel movements this morning with no bleeding.



She also has severe thrombocytopenia for which she was seen by Dr. Russell. Repeat platelet

count today was 70,000.



PHYSICAL EXAMINATION:

Appears comfortable in no apparent distress.  VITAL SIGNS:  Stable.  Blood pressure is

96/47, pulse is 63, temperature 97.9.

HEENT examination unremarkable.  Conjunctivae pink.  Sclerae anicteric.  Oral cavity no

lesions. Neck no jugular venous distention or lymph node enlargement.  Chest was clear

to  auscultation.  HEART:  Regular rate and rhythm.

ABDOMEN:  Soft, nontender, nondistended.  Bowel sounds are positive.  No organomegaly.

Extremities:  No pedal edema.  Skin no rashes.  NEUROLOGIC:  Alert and oriented x3.  No

focal deficits.



LABS:

From today WBC 2.6, hemoglobin 7.9, platelets are _____.  BUN and creatinine are

normal.



IMPRESSION:

1. Acute lower gastrointestinal bleed, status post colonoscopy 2 days ago.  Possible

    ischemic colitis.  Labs are still pending.  No further bleeding.

2. Thrombocytopenia for which Dr. Russell following the patient closely.



RECOMMENDATIONS:

1. Await biopsy results.

2. CBC tomorrow.

3. Advance diet as tolerated.

4. We will sign off.  The patient can follow up in office in 2 weeks following

    discharge from the hospital.

Thank you for this consultation.





MMKELLY / IJN: [id number] / Job#: [id number]

## 2018-07-23 LAB
ALBUMIN SERPL ELPH-MCNC: 3.04 G/DL (ref 3.8–4.9)
ERYTHROCYTE [DISTWIDTH] IN BLOOD BY AUTOMATED COUNT: 2.15 M/UL (ref 3.8–5.4)
ERYTHROCYTE [DISTWIDTH] IN BLOOD: 14.9 % (ref 11.5–15.5)
GAMMA GLOB SERPL ELPH-MCNC: 0.92 G/DL (ref 0.7–1.5)
HCT VFR BLD AUTO: 24 % (ref 34–46)
HGB BLD-MCNC: 8.1 GM/DL (ref 11.4–16)
MCH RBC QN AUTO: 37.8 PG (ref 25–35)
MCHC RBC AUTO-ENTMCNC: 33.8 G/DL (ref 31–37)
MCV RBC AUTO: 112 FL (ref 80–100)
PLATELET # BLD AUTO: 57 K/UL (ref 150–450)
WBC # BLD AUTO: 2.8 K/UL (ref 3.8–10.6)

## 2018-07-23 RX ADMIN — TIMOLOL MALEATE SCH DROPS: 5 SOLUTION OPHTHALMIC at 09:43

## 2018-07-23 RX ADMIN — HYDROCHLOROTHIAZIDE SCH MG: 12.5 CAPSULE ORAL at 09:43

## 2018-07-23 RX ADMIN — LOSARTAN POTASSIUM SCH MG: 50 TABLET, FILM COATED ORAL at 09:41

## 2018-07-23 RX ADMIN — POTASSIUM CHLORIDE SCH: 14.9 INJECTION, SOLUTION INTRAVENOUS at 16:52

## 2018-07-23 RX ADMIN — CARVEDILOL SCH MG: 12.5 TABLET, FILM COATED ORAL at 16:54

## 2018-07-23 RX ADMIN — CARVEDILOL SCH MG: 12.5 TABLET, FILM COATED ORAL at 09:41

## 2018-07-23 RX ADMIN — PANTOPRAZOLE SODIUM SCH MG: 40 INJECTION, POWDER, FOR SOLUTION INTRAVENOUS at 09:43

## 2018-07-23 RX ADMIN — PANTOPRAZOLE SODIUM SCH MG: 40 INJECTION, POWDER, FOR SOLUTION INTRAVENOUS at 21:33

## 2018-07-23 RX ADMIN — ALLOPURINOL SCH MG: 100 TABLET ORAL at 09:41

## 2018-07-23 RX ADMIN — DORZOLAMIDE HYDROCHLORIDE SCH DROPS: 20 SOLUTION/ DROPS OPHTHALMIC at 09:43

## 2018-07-23 RX ADMIN — DORZOLAMIDE HYDROCHLORIDE SCH DROPS: 20 SOLUTION/ DROPS OPHTHALMIC at 21:33

## 2018-07-23 NOTE — P.PN
Subjective


Progress Note Date: 07/23/18





This is an 84-year-old  female patient of Dr. Sheldon. with past history 

of hypertension, glaucoma, gout, anemia.  Patient states the last time she had 

blood work done with Dr. Sheldon, he told her that she was anemic.  Patient has 

had colonoscopies in the past with Dr. Kinsey on several occasions and she 

denies any problems with those in the past.  She states that yesterday she had 

bright red bleeding in the toilet.  She denies any maroon color to her stools.  

She states she has had this 9-10 years ago which time she had vomiting and 

bloody stool secondary to a perforated ulcer.  Patient denies having any 

abdominal pain but states her abdomen just doesn't feel right.  She denies any 

lightheadedness.  Patient came into [facility] emergency 

center for evaluation.  She was found to have a hemoglobin of 9.8, platelet 

count is currently 42, INR 1.1, creatinine 1.5.  Hemoccult blood was positive.  

Patient has been admitted to the [facility] floor and consult with GI has been 

requested.  Magnesium was also low at 0.8 and patient was given 2 g of 

magnesium sulfate.





7/20: Repeat lab work shows pancytopenia with hemoglobin of 7.4, WBC of 3.7 and 

a platelet count of 40.  Consult with Dr. Russell is pending.  BUN is 19 and 

creatinine 1.47.  Repeat magnesium is 1.2 which will be replaced. EGD with 

biopsy revealed large hiatal hernia, gastritis of the prepyloric area.  

Colonoscopy revealed segmental colitis of the sigmoid and descending colon 

consistent with acute ischemic colitis status post multiple biopsies.  

Scattered sigmoid diverticulosis.  Diet is to be advanced as tolerated per Dr. Griffiths.





7/21: Patient underwent EGD that showed mild gastritis, large hiatal hernia.  

Colonoscopy revealed segmental colitis in the sigmoid and descending colon with 

possible ischemic colitis with biopsies obtained.  Scattered sigmoid 

diverticulosis.  We have ordered an MRA of the abdomen to further assess.  

Hemoglobin today at 7.7, white count is 3.4 and platelet count 80.  Creatinine 

is 1.36.  Patient has been seen by Dr. Russell.  She has been seen by Dr. Solorzano in 

the past as she was sent there by Dr. Sheldon.  Patient does state that she is 

feeling better today. 





7/22: Patient states that her appetite is okay.  She ate breakfast and lunch 

yesterday but not dinner.  She denies having any nausea.  Hemoglobin today is 

at 7.9, white count 2.6 and platelet count 63.  Creatinine is 1.30.  Dr. Russell he 

is ordered extensive lab work.  We're waiting for MRA which possibly will 

happen tomorrow.  Anticipate discharge possibly by tomorrow.





7/23: Patient continues to be followed by oncology.  Repeat hemoglobin is 8.1, 

white count 2.8 and platelet count 57.  We are planning to cancel MRA of the 

abdomen as it will not be a good study as patient does not have contrast and 

contrast will not be given because of her renal failure.  Patient will be 

monitored overnight and plan for discharge tomorrow.











Objective





- Vital Signs


Vital signs: 


 Vital Signs











Temp  97.5 F L  07/23/18 06:41


 


Pulse  62   07/23/18 06:41


 


Resp  18   07/23/18 06:41


 


BP  119/72   07/23/18 06:41


 


Pulse Ox  97   07/23/18 06:41








 Intake & Output











 07/22/18 07/23/18 07/23/18





 18:59 06:59 18:59


 


Intake Total  450 


 


Balance  450 


 


Weight  66.088 kg 


 


Intake:   


 


  Oral  450 


 


Other:   


 


  Voiding Method  Bedside Commode 


 


  # Voids 2 3 


 


  # Bowel Movements 0 0 














- Exam





Gen: This is an 84-year-old  female.  She is sitting up in bed and 

appears to be comfortable and in no acute distress.


HEENT: Head is atraumatic, normocephalic. Pupils equal, round. Sclerae is 

anicteric. 


NECK: Supple. No JVD. No lymphadenopathy. No thyromegaly. 


LUNGS: Clear to auscultation. No wheezes or rhonchi.  No intercostal 

retractions.


HEART: Regular rate and rhythm. No murmur. 


ABDOMEN: Soft. Bowel sounds are present. No masses.  No tenderness.


EXTREMITIES: No pedal edema.  No calf tenderness.


NEUROLOGICAL: Patient is awake, alert and oriented x3. Cranial nerves 2 through 

12 are grossly intact. 











- Labs


CBC & Chem 7: 


 07/23/18 07:18





 07/22/18 08:28


Labs: 


 Abnormal Lab Results - Last 24 Hours (Table)











  07/21/18 07/21/18 07/23/18 Range/Units





  08:07 08:07 07:18 


 


WBC    2.8 L  (3.8-10.6)  k/uL


 


RBC    2.15 L  (3.80-5.40)  m/uL


 


Hgb    8.1 L  (11.4-16.0)  gm/dL


 


Hct    24.0 L  (34.0-46.0)  %


 


MCV    112.0 H  (80.0-100.0)  fL


 


MCH    37.8 H  (25.0-35.0)  pg


 


Plt Count    57 L  (150-450)  k/uL


 


Haptoglobin  213.0 H    (31.2-198.0)  mg/dL


 


TIBC  224 L    (228-460)  ug/dL


 


Vitamin B12  1648.0 H    (200.0-944.0)  pg/mL


 


RBC Folate   1,844 H   (280 - 791)  ng/mL


 


Free Kappa LC, Quant  9.25 H    (0.33-1.94)  mg/dL














Assessment and Plan


Plan: 





1.  Acute lower GI bleed secondary to acute ischemic colitis and also 

gastritis.  Status post EGD and colonoscopy.  Continue Protonix.  Monitor CBC 

daily.  MRA canceled.





2.  Chronic anemia of unclear etiology.  Possible component of acute blood 

loss.  Baseline hemoglobin is not known.





3.  Thrombocytopenia and now pancytopenic which is new for patient.  Consult 

with Dr. Russell.  Cytopenia workup in process.





4.  Hypomagnesemia status post replacement.  We will check repeat level and 

follow daily.





5.  Glaucoma.  Continue eyedrops.





6.  Hypertension.  Continue Coreg 12.5 mg twice daily, hydrochlorothiazide 12.5 

mg daily, losartan 100 mg daily.





7.  Gout stable.  Continue allopurinol 100 mg daily.





8.  Chronic kidney disease stage 3b.  Continue IV fluids at 75 mL per hour.





9.  DVT prophylaxis.  MARLO hose and SCDs.





10.  GI prophylaxis.  Protonix.





Discharge plan: Return to [facility] most likely by Tuesday


Impression and plan of care have been directed as dictated by the signing 

physician.  Salima Bergeron nurse practitioner acting as scribe for signing 

physician.

## 2018-07-23 NOTE — P.PN
Subjective


Progress Note Date: 07/23/18


Principal diagnosis: 





BRBPR, pancytopenia





Pt seen in f/u, she states she is eating and drinking fairly well, no nausea or 

vomiting, she denies any bleeding, swelling or new/unusual pain





Objective





- Vital Signs


Vital signs: 


 Vital Signs











Temp  98.4 F   07/23/18 14:54


 


Pulse  67   07/23/18 14:54


 


Resp  17   07/23/18 14:54


 


BP  117/58   07/23/18 14:54


 


Pulse Ox  94 L  07/23/18 14:54








 Intake & Output











 07/22/18 07/23/18 07/23/18





 18:59 06:59 18:59


 


Intake Total  450 200


 


Balance  450 200


 


Weight  66.088 kg 66.088 kg


 


Intake:   


 


  Oral  450 200


 


Other:   


 


  Voiding Method  Bedside Commode 


 


  # Voids 2 3 3


 


  # Bowel Movements 0 0 0














- Constitutional


General appearance: Present: cooperative, no acute distress, thin





- EENT


Eyes: Present: anicteric sclerae





- Respiratory


Respiratory: bilateral: CTA





- Cardiovascular


Heart sounds: normal: S1, S2





- Gastrointestinal


General gastrointestinal: Present: normal bowel sounds, soft





- Psychiatric


Psychiatric: Present: A&O x's 3, appropriate affect, intact judgment & insight





- Labs


CBC & Chem 7: 


 07/23/18 07:18





 07/22/18 08:28


Labs: 


 Abnormal Lab Results - Last 24 Hours (Table)











  07/21/18 07/21/18 07/23/18 Range/Units





  08:07 08:07 07:18 


 


WBC    2.8 L  (3.8-10.6)  k/uL


 


RBC    2.15 L  (3.80-5.40)  m/uL


 


Hgb    8.1 L  (11.4-16.0)  gm/dL


 


Hct    24.0 L  (34.0-46.0)  %


 


MCV    112.0 H  (80.0-100.0)  fL


 


MCH    37.8 H  (25.0-35.0)  pg


 


Plt Count    57 L  (150-450)  k/uL


 


Haptoglobin  213.0 H    (31.2-198.0)  mg/dL


 


TIBC  224 L    (228-460)  ug/dL


 


Total Protein (PEP)  5.5 L    (6.2-8.2)  g/dL


 


Albumin (PEP)  3.04 L    (3.80-4.90)  g/dL


 


Beta Globulins  0.52 L    (0.60-1.30)  g/dL


 


Vitamin B12  1648.0 H    (200.0-944.0)  pg/mL


 


RBC Folate   1,844 H   (280 - 791)  ng/mL


 


Free Kappa LC, Quant  9.25 H    (0.33-1.94)  mg/dL














Assessment and Plan


(1) Pancytopenia


Narrative/Plan: 


Pt was seen back in Jan 2018 by Dr. Solorzano for small amt of monoclonal protein 

in her blood, her CBC was normal other then mild anemia.  All of pt counts are 

low so, pending SPEP/immunofixation will be compared to previous findings.  

While pt counts are they are in a reasonably safe range, no transfusions or 

supportive meds at this time.  No nutritional deficiencies noted.  Will cont to 

monitor CBC while inpatient, will report lab findings once work up complete, 

further recommendations to follow.     


Current Visit: Yes   Status: Acute   Priority: Medium   Code(s): D61.818 - 

OTHER PANCYTOPENIA   SNOMED Code(s): 215090162

## 2018-07-24 VITALS
TEMPERATURE: 97.9 F | DIASTOLIC BLOOD PRESSURE: 66 MMHG | SYSTOLIC BLOOD PRESSURE: 115 MMHG | HEART RATE: 64 BPM | RESPIRATION RATE: 18 BRPM

## 2018-07-24 RX ADMIN — PANTOPRAZOLE SODIUM SCH MG: 40 INJECTION, POWDER, FOR SOLUTION INTRAVENOUS at 08:44

## 2018-07-24 RX ADMIN — ALLOPURINOL SCH MG: 100 TABLET ORAL at 08:43

## 2018-07-24 RX ADMIN — TIMOLOL MALEATE SCH DROPS: 5 SOLUTION OPHTHALMIC at 08:44

## 2018-07-24 RX ADMIN — CARVEDILOL SCH MG: 12.5 TABLET, FILM COATED ORAL at 08:43

## 2018-07-24 RX ADMIN — LOSARTAN POTASSIUM SCH MG: 50 TABLET, FILM COATED ORAL at 08:43

## 2018-07-24 RX ADMIN — HYDROCHLOROTHIAZIDE SCH MG: 12.5 CAPSULE ORAL at 08:43

## 2018-07-24 RX ADMIN — DORZOLAMIDE HYDROCHLORIDE SCH DROPS: 20 SOLUTION/ DROPS OPHTHALMIC at 08:43

## 2018-07-24 NOTE — P.DS
Providers


Date of admission: 


07/18/18 21:23





Expected date of discharge: 07/24/18


Attending physician: 


Marc Talley





Consults: 





 





07/18/18 21:28


Consult Physician Routine 


   Consulting Provider: Zach Giordano


   Consult Reason/Comments: Gi bleed


   Do you want consulting provider notified?: Yes, Notify in am





07/19/18 13:31


Consult Physician Routine 


   Consulting Provider: Cristóbal Rsusell


   Consult Reason/Comments: Thrombocytopenia


   Do you want consulting provider notified?: Yes











Primary care physician: 


Terence Sheldon





[facility] Course: 





This is an 84-year-old  female patient of Dr. Sheldon. with past history 

of hypertension, glaucoma, gout, anemia.  Patient states the last time she had 

blood work done with Dr. Sheldon, he told her that she was anemic.  Patient has 

had colonoscopies in the past with Dr. Kinsey on several occasions and she 

denies any problems with those in the past.  She states that yesterday she had 

bright red bleeding in the toilet.  She denies any maroon color to her stools.  

She states she has had this 9-10 years ago which time she had vomiting and 

bloody stool secondary to a perforated ulcer.  Patient denies having any 

abdominal pain but states her abdomen just doesn't feel right.  She denies any 

lightheadedness.  Patient came into [facility] emergency 

center for evaluation.  She was found to have a hemoglobin of 9.8, platelet 

count is currently 42, INR 1.1, creatinine 1.5.  Hemoccult blood was positive.  

Patient has been admitted to the [facility] floor and consult with GI has been 

requested.  Magnesium was also low at 0.8 and patient was given 2 g of 

magnesium sulfate.





7/20: Repeat lab work shows pancytopenia with hemoglobin of 7.4, WBC of 3.7 and 

a platelet count of 40.  Consult with Dr. Russell is pending.  BUN is 19 and 

creatinine 1.47.  Repeat magnesium is 1.2 which will be replaced. EGD with 

biopsy revealed large hiatal hernia, gastritis of the prepyloric area.  

Colonoscopy revealed segmental colitis of the sigmoid and descending colon 

consistent with acute ischemic colitis status post multiple biopsies.  

Scattered sigmoid diverticulosis.  Diet is to be advanced as tolerated per Dr. Griffiths.





7/21: Patient underwent EGD that showed mild gastritis, large hiatal hernia.  

Colonoscopy revealed segmental colitis in the sigmoid and descending colon with 

possible ischemic colitis with biopsies obtained.  Scattered sigmoid 

diverticulosis.  We have ordered an MRA of the abdomen to further assess.  

Hemoglobin today at 7.7, white count is 3.4 and platelet count 80.  Creatinine 

is 1.36.  Patient has been seen by Dr. Russell.  She has been seen by Dr. Brambila in 

the past as she was sent there by Dr. Sheldon.  Patient does state that she is 

feeling better today. 





7/22: Patient states that her appetite is okay.  She ate breakfast and lunch 

yesterday but not dinner.  She denies having any nausea.  Hemoglobin today is 

at 7.9, white count 2.6 and platelet count 63.  Creatinine is 1.30.  Dr. Russell he 

is ordered extensive lab work.  We're waiting for MRA which possibly will 

happen tomorrow.  Anticipate discharge possibly by tomorrow.





7/23: Patient continues to be followed by oncology.  Repeat hemoglobin is 8.1, 

white count 2.8 and platelet count 57.  We are planning to cancel MRA of the 

abdomen as it will not be a good study as patient does not have contrast and 

contrast will not be given because of her renal failure.  Patient will be 

monitored overnight and plan for discharge tomorrow.





7/24: Vision is been followed by oncology with plan for outpatient bone marrow 

aspiration.  Patient has had no further bleeding.  Hemoglobin is stable.  

Patient will be discharged home today in stable condition.





Discharge diagnoses:


1.  Acute lower GI bleed secondary to acute ischemic colitis and also 

gastritis.  


2.  Chronic anemia most likely secondary to myelodysplasia


3.  Thrombocytopenia and now pancytopenic most likely secondary to 

myelodysplasia


4.  Hypomagnesemia status post replacement.  


5.  Glaucoma.  


6.  Hypertension.  


7.  Gout stable.  


8.  Chronic kidney disease stage 3b.  





Discharge plan: Return to [facility] 


Impression and plan of care have been directed as dictated by the signing 

physician.  Salima Bergeron nurse practitioner acting as scribe for signing 

physician.





Patient Condition at Discharge: Good





Plan - Discharge Summary


Discharge Rx Participant: No


New Discharge Prescriptions: 


Continue


   Losartan/Hydrochlorothiazide [Losartan-Hctz 100-12.5 mg Tab] 1 tab PO DAILY


   Allopurinol [Zyloprim] 100 mg PO DAILY


   Carvedilol [Coreg] 12.5 mg PO BID


   Dorzolamide/Timolol/Pf [Cosopt Pf 2%/5% Ophth Droperette] 1 drop RIGHT EYE 

BID


   Ranitidine HCl [Zantac] 150 mg PO HS


   Latanoprost Ophth [Xalatan 0.005%] 1 drop BOTH EYES HS


   Calcitriol [Rocaltrol] 0.25 mcg PO DAILY


   Travon/D3/Mag11/Zinc//Alex/Bor [Caltrate 600+D Plus Tablet] 1 tab PO DAILY


Discharge Medication List





Allopurinol [Zyloprim] 100 mg PO DAILY 07/18/18 [History]


Carvedilol [Coreg] 12.5 mg PO BID 07/18/18 [History]


Losartan/Hydrochlorothiazide [Losartan-Hctz 100-12.5 mg Tab] 1 tab PO DAILY 07/ 18/18 [History]


Travon/D3/Mag11/Zinc//Alex/Bor [Caltrate 600+D Plus Tablet] 1 tab PO DAILY 07/19 /18 [History]


Calcitriol [Rocaltrol] 0.25 mcg PO DAILY 07/19/18 [History]


Dorzolamide/Timolol/Pf [Cosopt Pf 2%/5% Ophth Droperette] 1 drop RIGHT EYE BID 

07/19/18 [History]


Latanoprost Ophth [Xalatan 0.005%] 1 drop BOTH EYES HS 07/19/18 [History]


Ranitidine HCl [Zantac] 150 mg PO HS 07/19/18 [History]








Follow up Appointment(s)/Referral(s): 


Cristóbal Russell MD [STAFF PHYSICIAN] - 08/10/18 3:30 pm (WITH DR. BRAMBILA, OFFICE WILL 

FOLLOW UP WITH BIOPSY)


Terence Sheldon MD [Primary Care Provider] - 07/31/18 9:15 am (WITH NURSE 

PRACTIONER CASEY NOLASCO)


Sana Griffiths MD [STAFF PHYSICIAN] - 08/02/18 4:45 pm


Patient Instructions/Handouts:  Anemia (DC), Thrombocytopenia (DC)


Activity/Diet/Wound Care/Special Instructions: 


ACTIVITY AS TOLERATED


DR. BRAMBILA'S OFFICE WILL CONTACT YOU TO SCHEDULE BONE BIOPSY


Discharge Disposition: HOME SELF-CARE

## 2018-07-24 NOTE — P.PN
Subjective


Progress Note Date: 07/24/18


Principal diagnosis: 





BRBPR, pancytopenia





Pt seen in f/u, she feels ok, eating and drinking, no bleeding, she is 

ambulating.





Objective





- Vital Signs


Vital signs: 


 Vital Signs











Temp  97.9 F   07/24/18 07:00


 


Pulse  64   07/24/18 07:00


 


Resp  18   07/24/18 07:00


 


BP  115/66   07/24/18 07:00


 


Pulse Ox  94 L  07/24/18 07:00








 Intake & Output











 07/23/18 07/24/18 07/24/18





 18:59 06:59 18:59


 


Intake Total 200 475 


 


Balance 200 475 


 


Weight 66.088 kg  


 


Intake:   


 


  Oral 200 475 


 


Other:   


 


  # Voids 3 2 


 


  # Bowel Movements 0  














- Constitutional


General appearance: Present: cooperative, no acute distress, thin





- EENT


Eyes: Present: anicteric sclerae





- Respiratory


Respiratory: bilateral: CTA





- Cardiovascular


Heart sounds: normal: S1, S2





- Gastrointestinal


General gastrointestinal: Present: normal bowel sounds, soft





- Integumentary


Integumentary: Present: pale





- Neurologic


Neurologic: Present: CNII-XII intact





- Musculoskeletal


Musculoskeletal: Present: generalized weakness, strength equal bilaterally





- Psychiatric


Psychiatric: Present: A&O x's 3, appropriate affect, intact judgment & insight





- Labs


CBC & Chem 7: 


 07/23/18 07:18





 07/22/18 08:28


Labs: 


 Abnormal Lab Results - Last 24 Hours (Table)











  07/21/18 Range/Units





  08:07 


 


Total Protein (PEP)  5.5 L  (6.2-8.2)  g/dL


 


Albumin (PEP)  3.04 L  (3.80-4.90)  g/dL


 


Beta Globulins  0.52 L  (0.60-1.30)  g/dL














Assessment and Plan


(1) Pancytopenia


Narrative/Plan: 


Immunofixation shows IgG lambda and an additional kappa light chain paraprotein

, recommendation is for bone marrow biopsy and aspirate.  Dr. Russell reviewed 

purpose of testing, procedure and risks.  Pt is agreeable to proceed with BM Bx 

and Asp.  


It was discussed with pt and Attending Physician that this could be done 

outpatient as her CBC is stable.  Pt will be contacted by Dr. Russell's office to 

sched procedure and follow up.     


Status: Acute   Priority: Medium   Code(s): D61.818 - OTHER PANCYTOPENIA   

SNOMED Code(s): 342038913


   


Plan: 





Doctor attests:


I performed a history and physical examination of this patient, discussed with 

dictator.  I agree with dictators note, documented as a scribe.

## 2019-03-28 ENCOUNTER — HOSPITAL ENCOUNTER (EMERGENCY)
Dept: HOSPITAL 47 - EC | Age: 84
Discharge: HOME | End: 2019-03-28
Payer: MEDICARE

## 2019-03-28 VITALS — RESPIRATION RATE: 18 BRPM | TEMPERATURE: 98.3 F

## 2019-03-28 VITALS — HEART RATE: 79 BPM | DIASTOLIC BLOOD PRESSURE: 45 MMHG | SYSTOLIC BLOOD PRESSURE: 93 MMHG

## 2019-03-28 DIAGNOSIS — Z96.643: ICD-10-CM

## 2019-03-28 DIAGNOSIS — R53.1: Primary | ICD-10-CM

## 2019-03-28 DIAGNOSIS — Z88.1: ICD-10-CM

## 2019-03-28 DIAGNOSIS — Z87.891: ICD-10-CM

## 2019-03-28 DIAGNOSIS — Z79.899: ICD-10-CM

## 2019-03-28 DIAGNOSIS — R11.0: ICD-10-CM

## 2019-03-28 DIAGNOSIS — Z79.02: ICD-10-CM

## 2019-03-28 DIAGNOSIS — Z91.048: ICD-10-CM

## 2019-03-28 DIAGNOSIS — Z88.8: ICD-10-CM

## 2019-03-28 DIAGNOSIS — I10: ICD-10-CM

## 2019-03-28 LAB
ALBUMIN SERPL-MCNC: 3.3 G/DL (ref 3.5–5)
ALP SERPL-CCNC: 48 U/L (ref 38–126)
ALT SERPL-CCNC: 20 U/L (ref 9–52)
ANION GAP SERPL CALC-SCNC: 8 MMOL/L
APTT BLD: 18.3 SEC (ref 22–30)
AST SERPL-CCNC: 21 U/L (ref 14–36)
BASOPHILS # BLD AUTO: 0 K/UL (ref 0–0.2)
BASOPHILS NFR BLD AUTO: 0 %
BUN SERPL-SCNC: 25 MG/DL (ref 7–17)
CALCIUM SPEC-MCNC: 9.9 MG/DL (ref 8.4–10.2)
CHLORIDE SERPL-SCNC: 103 MMOL/L (ref 98–107)
CO2 SERPL-SCNC: 25 MMOL/L (ref 22–30)
EOSINOPHIL # BLD AUTO: 0.1 K/UL (ref 0–0.7)
EOSINOPHIL NFR BLD AUTO: 3 %
ERYTHROCYTE [DISTWIDTH] IN BLOOD BY AUTOMATED COUNT: 1.94 M/UL (ref 3.8–5.4)
ERYTHROCYTE [DISTWIDTH] IN BLOOD: 14.9 % (ref 11.5–15.5)
GLUCOSE SERPL-MCNC: 110 MG/DL (ref 74–99)
HCT VFR BLD AUTO: 23.5 % (ref 34–46)
HGB BLD-MCNC: 7.8 GM/DL (ref 11.4–16)
INR PPP: 0.9 (ref ?–1.2)
LYMPHOCYTES # SPEC AUTO: 1.3 K/UL (ref 1–4.8)
LYMPHOCYTES NFR SPEC AUTO: 40 %
MAGNESIUM SPEC-SCNC: 2 MG/DL (ref 1.6–2.3)
MCH RBC QN AUTO: 40.3 PG (ref 25–35)
MCHC RBC AUTO-ENTMCNC: 33.1 G/DL (ref 31–37)
MCV RBC AUTO: 121.5 FL (ref 80–100)
MONOCYTES # BLD AUTO: 0.2 K/UL (ref 0–1)
MONOCYTES NFR BLD AUTO: 6 %
NEUTROPHILS # BLD AUTO: 1.5 K/UL (ref 1.3–7.7)
NEUTROPHILS NFR BLD AUTO: 48 %
PLATELET # BLD AUTO: 36 K/UL (ref 150–450)
POTASSIUM SERPL-SCNC: 5 MMOL/L (ref 3.5–5.1)
PROT SERPL-MCNC: 6 G/DL (ref 6.3–8.2)
PT BLD: 10.1 SEC (ref 9–12)
SODIUM SERPL-SCNC: 136 MMOL/L (ref 137–145)
WBC # BLD AUTO: 3.2 K/UL (ref 3.8–10.6)

## 2019-03-28 PROCEDURE — 36415 COLL VENOUS BLD VENIPUNCTURE: CPT

## 2019-03-28 PROCEDURE — 84100 ASSAY OF PHOSPHORUS: CPT

## 2019-03-28 PROCEDURE — 93005 ELECTROCARDIOGRAM TRACING: CPT

## 2019-03-28 PROCEDURE — 96374 THER/PROPH/DIAG INJ IV PUSH: CPT

## 2019-03-28 PROCEDURE — 83735 ASSAY OF MAGNESIUM: CPT

## 2019-03-28 PROCEDURE — 83605 ASSAY OF LACTIC ACID: CPT

## 2019-03-28 PROCEDURE — 71046 X-RAY EXAM CHEST 2 VIEWS: CPT

## 2019-03-28 PROCEDURE — 85025 COMPLETE CBC W/AUTO DIFF WBC: CPT

## 2019-03-28 PROCEDURE — 85730 THROMBOPLASTIN TIME PARTIAL: CPT

## 2019-03-28 PROCEDURE — 85610 PROTHROMBIN TIME: CPT

## 2019-03-28 PROCEDURE — 96361 HYDRATE IV INFUSION ADD-ON: CPT

## 2019-03-28 PROCEDURE — 99285 EMERGENCY DEPT VISIT HI MDM: CPT

## 2019-03-28 PROCEDURE — 80053 COMPREHEN METABOLIC PANEL: CPT

## 2019-03-28 PROCEDURE — 84484 ASSAY OF TROPONIN QUANT: CPT

## 2019-03-28 NOTE — ED
Recheck HPI





- General


Chief Complaint: Recheck/Abnormal Lab/Rx


Stated Complaint: Hypotension


Time Seen by Provider: 19 13:27


Source: EMS, RN notes reviewed, old records reviewed


Mode of arrival: EMS


Limitations: no limitations





- History of Present Illness


Initial Comments: 





This is an 85-year-old female the ER for evaluation.  Patient presents asym

ptomatic without complaint.  Patient sent in for evaluation of possible low 

blood pressure with nausea.  Again patient denies any chest pain shortness 

breath abdominal pain no recent fevers.  No change in medications.  She has 

admitted to being thirsty


MD Complaint: other (Abdominal blood pressure)


-: unknown


Returns Today for: other (Recheck blood pressure)


Symptoms Since Prior Visit: no new symptoms


Associated Symptoms: none





- Related Data


                                Home Medications











 Medication  Instructions  Recorded  Confirmed


 


Allopurinol [Zyloprim] 100 mg PO DAILY 18


 


Carvedilol [Coreg] 12.5 mg PO BID 18


 


Losartan/Hydrochlorothiazide 1 tab PO DAILY 18





[Losartan-Hctz 100-12.5 mg Tab]   


 


Travon/D3/Mag11/Zinc//Alex/Bor 1 tab PO DAILY 18





[Caltrate 600+D Plus Tablet]   


 


Dorzolamide/Timolol/Pf [Cosopt Pf 1 drop RIGHT EYE BID 18





2%/5% Ophth Droperette]   


 


Latanoprost Ophth [Xalatan 0.005%] 1 drop BOTH EYES HS 18


 


Ranitidine HCl [Zantac] 150 mg PO HS 18


 


Cholecalciferol [Vitamin D3] 1,000 unit PO DAILY 19


 


Magnesium 400 mg PO BID 19


 


Timolol Maleate [Timolol Maleate 1 drop RIGHT EYE BID 19





0.5% Ophth Gel]   


 


traMADol HCL [Ultram] 50 mg PO TID PRN 19











                                    Allergies











Allergy/AdvReac Type Severity Reaction Status Date / Time


 


adhesive tape Allergy  Unknown Verified 19 15:11


 


dexamethasone Allergy  Unknown Verified 19 15:11


 


neomycin Allergy  Unknown Verified 19 15:11


 


polymyxin B Allergy  Unknown Verified 19 15:11














Review of Systems


ROS Statement: 


Those systems with pertinent positive or pertinent negative responses have been 

documented in the HPI.





ROS Other: All systems not noted in ROS Statement are negative.





Past Medical History


Past Medical History: Hypertension


Additional Past Medical History / Comment(s): glaucoma, gout, anemia


History of Any Multi-Drug Resistant Organisms: None Reported


Past Surgical History: Joint Replacement, Orthopedic Surgery


Additional Past Surgical History / Comment(s): Partial right hip replacement, 

bilateral cataract removal and intraocular lens implants


Past Psychological History: No Psychological Hx Reported


Smoking Status: Former smoker


Past Alcohol Use History: None Reported


Past Drug Use History: None Reported





- Past Family History


  ** Father


Additional Family Medical History / Comment(s): Father  in his 60s





  ** Mother


Additional Family Medical History / Comment(s): Mother  in her 90s from old 

age.  Patient has 1 sister with no major medical problems.  Patient does not 

have any brothers or children.





General Exam


Limitations: no limitations


General appearance: alert, in no apparent distress


Head exam: Present: atraumatic, normocephalic, normal inspection


Eye exam: Present: normal appearance, PERRL, EOMI.  Absent: scleral icterus, 

conjunctival injection, periorbital swelling


ENT exam: Present: normal exam, mucous membranes moist


Neck exam: Present: normal inspection.  Absent: tenderness, meningismus, 

lymphadenopathy


Respiratory exam: Present: normal lung sounds bilaterally.  Absent: respiratory 

distress, wheezes, rales, rhonchi, stridor


Cardiovascular Exam: Present: regular rate, normal rhythm, normal heart sounds. 

 Absent: systolic murmur, diastolic murmur, rubs, gallop, clicks


GI/Abdominal exam: Present: soft, normal bowel sounds.  Absent: distended, 

tenderness, guarding, rebound, rigid


Extremities exam: Present: normal inspection, full ROM, normal capillary refill.

  Absent: tenderness, pedal edema, joint swelling, calf tenderness


Back exam: Present: normal inspection


Neurological exam: Present: alert, oriented X3, CN II-XII intact


Psychiatric exam: Present: normal affect, normal mood


Skin exam: Present: warm, dry, intact, normal color.  Absent: rash





Course


                                   Vital Signs











  19





  13:19 13:23 13:30


 


Temperature 98.3 F  


 


Pulse Rate 63  63


 


Respiratory 18  





Rate   


 


Blood Pressure 102/63  102/63


 


O2 Sat by Pulse 97 83 L 97





Oximetry   














  19





  13:40 13:50 14:00


 


Temperature   


 


Pulse Rate 57 L 66 62


 


Respiratory   





Rate   


 


Blood Pressure 87/49 87/49 87/49


 


O2 Sat by Pulse 94 L 92 L 93 L





Oximetry   














  19





  14:10 14:20 14:30


 


Temperature   


 


Pulse Rate 63 73 


 


Respiratory   





Rate   


 


Blood Pressure 87/49 100/54 100/54


 


O2 Sat by Pulse 91 L 92 L 





Oximetry   














  19





  14:40 14:50 15:00


 


Temperature   


 


Pulse Rate 73 68 70


 


Respiratory   





Rate   


 


Blood Pressure 100/54 102/58 102/58


 


O2 Sat by Pulse 95 93 L 





Oximetry   














  19





  15:10


 


Temperature 


 


Pulse Rate 66


 


Respiratory 18





Rate 


 


Blood Pressure 99/54


 


O2 Sat by Pulse 93 L





Oximetry 














- Reevaluation(s)


Reevaluation #1: 





19 16:15


Medical record reviewed


Reevaluation #2: 





19 16:15


Patient's blood pressures improved


Reevaluation #3: 





19 16:15


Dr. Talley, patient okay for discharge as she is asymptomatic, again spoke with 

patient states she feels well





Medical Decision Making





- Medical Decision Making





85 female the ER for evaluation presents today for evaluation regards to low 

blood pressure and nausea.  Blood pressures resolved, patient remains asym

ptomatic and can be discharged home





- Lab Data


Result diagrams: 


                                 19 13:31





                                 19 13:31


                                   Lab Results











  19 Range/Units





  13:31 13:31 13:31 


 


WBC  3.2 L    (3.8-10.6)  k/uL


 


RBC  1.94 L    (3.80-5.40)  m/uL


 


Hgb  7.8 L    (11.4-16.0)  gm/dL


 


Hct  23.5 L    (34.0-46.0)  %


 


MCV  121.5 H    (80.0-100.0)  fL


 


MCH  40.3 H    (25.0-35.0)  pg


 


MCHC  33.1    (31.0-37.0)  g/dL


 


RDW  14.9    (11.5-15.5)  %


 


Plt Count  36 L    (150-450)  k/uL


 


Neutrophils %  48    %


 


Lymphocytes %  40    %


 


Monocytes %  6    %


 


Eosinophils %  3    %


 


Basophils %  0    %


 


Neutrophils #  1.5    (1.3-7.7)  k/uL


 


Lymphocytes #  1.3    (1.0-4.8)  k/uL


 


Monocytes #  0.2    (0-1.0)  k/uL


 


Eosinophils #  0.1    (0-0.7)  k/uL


 


Basophils #  0.0    (0-0.2)  k/uL


 


Manual Slide Review  Performed    


 


Poikilocytosis (manual  Present    


 


Macrocytosis  Marked    


 


PT     (9.0-12.0)  sec


 


INR     (<1.2)  


 


APTT     (22.0-30.0)  sec


 


Sodium   136 L   (137-145)  mmol/L


 


Potassium   5.0   (3.5-5.1)  mmol/L


 


Chloride   103   ()  mmol/L


 


Carbon Dioxide   25   (22-30)  mmol/L


 


Anion Gap   8   mmol/L


 


BUN   25 H   (7-17)  mg/dL


 


Creatinine   1.64 H   (0.52-1.04)  mg/dL


 


Est GFR (CKD-EPI)AfAm   33   (>60 ml/min/1.73 sqM)  


 


Est GFR (CKD-EPI)NonAf   28   (>60 ml/min/1.73 sqM)  


 


Glucose   110 H   (74-99)  mg/dL


 


Plasma Lactic Acid Carlos    2.3 H*  (0.7-2.0)  mmol/L


 


Calcium   9.9   (8.4-10.2)  mg/dL


 


Phosphorus   3.4   (2.5-4.5)  mg/dL


 


Magnesium   2.0   (1.6-2.3)  mg/dL


 


Total Bilirubin   0.7   (0.2-1.3)  mg/dL


 


AST   21   (14-36)  U/L


 


ALT   20   (9-52)  U/L


 


Alkaline Phosphatase   48   ()  U/L


 


Troponin I     (0.000-0.034)  ng/mL


 


Total Protein   6.0 L   (6.3-8.2)  g/dL


 


Albumin   3.3 L   (3.5-5.0)  g/dL














  03/28/19 03/28/19 Range/Units





  13:31 14:23 


 


WBC    (3.8-10.6)  k/uL


 


RBC    (3.80-5.40)  m/uL


 


Hgb    (11.4-16.0)  gm/dL


 


Hct    (34.0-46.0)  %


 


MCV    (80.0-100.0)  fL


 


MCH    (25.0-35.0)  pg


 


MCHC    (31.0-37.0)  g/dL


 


RDW    (11.5-15.5)  %


 


Plt Count    (150-450)  k/uL


 


Neutrophils %    %


 


Lymphocytes %    %


 


Monocytes %    %


 


Eosinophils %    %


 


Basophils %    %


 


Neutrophils #    (1.3-7.7)  k/uL


 


Lymphocytes #    (1.0-4.8)  k/uL


 


Monocytes #    (0-1.0)  k/uL


 


Eosinophils #    (0-0.7)  k/uL


 


Basophils #    (0-0.2)  k/uL


 


Manual Slide Review    


 


Poikilocytosis (manual    


 


Macrocytosis    


 


PT   10.1  (9.0-12.0)  sec


 


INR   0.9  (<1.2)  


 


APTT   18.3 L  (22.0-30.0)  sec


 


Sodium    (137-145)  mmol/L


 


Potassium    (3.5-5.1)  mmol/L


 


Chloride    ()  mmol/L


 


Carbon Dioxide    (22-30)  mmol/L


 


Anion Gap    mmol/L


 


BUN    (7-17)  mg/dL


 


Creatinine    (0.52-1.04)  mg/dL


 


Est GFR (CKD-EPI)AfAm    (>60 ml/min/1.73 sqM)  


 


Est GFR (CKD-EPI)NonAf    (>60 ml/min/1.73 sqM)  


 


Glucose    (74-99)  mg/dL


 


Plasma Lactic Acid Carlos    (0.7-2.0)  mmol/L


 


Calcium    (8.4-10.2)  mg/dL


 


Phosphorus    (2.5-4.5)  mg/dL


 


Magnesium    (1.6-2.3)  mg/dL


 


Total Bilirubin    (0.2-1.3)  mg/dL


 


AST    (14-36)  U/L


 


ALT    (9-52)  U/L


 


Alkaline Phosphatase    ()  U/L


 


Troponin I  <0.012   (0.000-0.034)  ng/mL


 


Total Protein    (6.3-8.2)  g/dL


 


Albumin    (3.5-5.0)  g/dL














- EKG Data


-: EKG Interpreted by Me (EKG shows sinus rhythm rate of 63, , QRS 76, QTc

 419)





Disposition


Clinical Impression: 


 Weakness





Disposition: HOME SELF-CARE


Condition: Good


Instructions (If sedation given, give patient instructions):  Weakness (ED)


Is patient prescribed a controlled substance at d/c from ED?: No


Referrals: 


Terence Sheldon MD [Primary Care Provider] - 1-2 days

## 2019-03-28 NOTE — XR
EXAMINATION TYPE: XR chest 2V

 

DATE OF EXAM: 3/28/2019

 

COMPARISON: NONE

 

HISTORY: Shortness of breath

 

TECHNIQUE:  Frontal and lateral views of the chest are obtained.

 

FINDINGS:

 

Scattered senescent parenchymal changes noted. Hyperinflation compatible with COPD. 

 

No evidence for infiltrate. No evidence for atelectasis.

 

Heart size is stable. The stomach appears to be virtually intrathoracic in location.

 

Mediastinal structures are stable and grossly unremarkable.

 

No evidence for hilar prominence.

 

Degenerative changes dorsal spine. 

 

IMPRESSION:

1. No evidence for acute pulmonary disease.

## 2020-02-29 NOTE — XR
EXAMINATION TYPE: XR abdomen acute w cxr

 

DATE OF EXAM: 7/18/2018

 

COMPARISON: NONE

 

HISTORY: Abdominal pain. GI bleed.

 

TECHNIQUE:  Chest x-ray. Supine and upright abdomen.

 

FINDINGS:  

 

Heart is moderately enlarged. There is no heart failure. There is hiatal hernia. Lungs are clear of c
onsolidation.

 

There is no sign of intestinal obstruction or pneumoperitoneum. There is lumbar levoscoliosis. Abdomi
nal aorta is atheromatous. There is right hip prosthesis. There are are chest leads.

IMPRESSION: 

Nonacute abdomen. Cardiomegaly. No active cardiopulmonary disease. Daily smoker 1 ppd

## 2020-03-17 ENCOUNTER — HOSPITAL ENCOUNTER (OUTPATIENT)
Dept: HOSPITAL 47 - EC | Age: 85
Setting detail: OBSERVATION
LOS: 1 days | Discharge: HOME | End: 2020-03-18
Attending: INTERNAL MEDICINE | Admitting: INTERNAL MEDICINE
Payer: MEDICARE

## 2020-03-17 DIAGNOSIS — M1A.9XX0: ICD-10-CM

## 2020-03-17 DIAGNOSIS — I12.9: ICD-10-CM

## 2020-03-17 DIAGNOSIS — K57.30: ICD-10-CM

## 2020-03-17 DIAGNOSIS — E66.9: ICD-10-CM

## 2020-03-17 DIAGNOSIS — D46.9: ICD-10-CM

## 2020-03-17 DIAGNOSIS — Z87.891: ICD-10-CM

## 2020-03-17 DIAGNOSIS — D69.6: ICD-10-CM

## 2020-03-17 DIAGNOSIS — Z91.048: ICD-10-CM

## 2020-03-17 DIAGNOSIS — R10.13: Primary | ICD-10-CM

## 2020-03-17 DIAGNOSIS — Z88.8: ICD-10-CM

## 2020-03-17 DIAGNOSIS — Z79.891: ICD-10-CM

## 2020-03-17 DIAGNOSIS — Z87.19: ICD-10-CM

## 2020-03-17 DIAGNOSIS — H40.9: ICD-10-CM

## 2020-03-17 DIAGNOSIS — Z88.1: ICD-10-CM

## 2020-03-17 DIAGNOSIS — Z79.899: ICD-10-CM

## 2020-03-17 DIAGNOSIS — K44.9: ICD-10-CM

## 2020-03-17 DIAGNOSIS — N18.3: ICD-10-CM

## 2020-03-17 DIAGNOSIS — Z96.643: ICD-10-CM

## 2020-03-17 DIAGNOSIS — K55.9: ICD-10-CM

## 2020-03-17 PROCEDURE — 83690 ASSAY OF LIPASE: CPT

## 2020-03-17 PROCEDURE — 93005 ELECTROCARDIOGRAM TRACING: CPT

## 2020-03-17 PROCEDURE — 81001 URINALYSIS AUTO W/SCOPE: CPT

## 2020-03-17 PROCEDURE — 85025 COMPLETE CBC W/AUTO DIFF WBC: CPT

## 2020-03-17 PROCEDURE — 96374 THER/PROPH/DIAG INJ IV PUSH: CPT

## 2020-03-17 PROCEDURE — 96361 HYDRATE IV INFUSION ADD-ON: CPT

## 2020-03-17 PROCEDURE — 80053 COMPREHEN METABOLIC PANEL: CPT

## 2020-03-17 PROCEDURE — 74177 CT ABD & PELVIS W/CONTRAST: CPT

## 2020-03-17 PROCEDURE — 71250 CT THORAX DX C-: CPT

## 2020-03-17 PROCEDURE — 36415 COLL VENOUS BLD VENIPUNCTURE: CPT

## 2020-03-17 PROCEDURE — 96360 HYDRATION IV INFUSION INIT: CPT

## 2020-03-17 PROCEDURE — 99285 EMERGENCY DEPT VISIT HI MDM: CPT

## 2020-03-17 PROCEDURE — 83605 ASSAY OF LACTIC ACID: CPT

## 2020-03-18 VITALS — RESPIRATION RATE: 16 BRPM

## 2020-03-18 VITALS — TEMPERATURE: 97.7 F | HEART RATE: 80 BPM | DIASTOLIC BLOOD PRESSURE: 80 MMHG | SYSTOLIC BLOOD PRESSURE: 137 MMHG

## 2020-03-18 LAB
ALBUMIN SERPL-MCNC: 3.8 G/DL (ref 3.5–5)
ALP SERPL-CCNC: 67 U/L (ref 38–126)
ALT SERPL-CCNC: 13 U/L (ref 4–34)
ANION GAP SERPL CALC-SCNC: 6 MMOL/L
AST SERPL-CCNC: 23 U/L (ref 14–36)
BUN SERPL-SCNC: 23 MG/DL (ref 7–17)
CALCIUM SPEC-MCNC: 10.2 MG/DL (ref 8.4–10.2)
CELLS COUNTED: 100
CHLORIDE SERPL-SCNC: 102 MMOL/L (ref 98–107)
CO2 SERPL-SCNC: 26 MMOL/L (ref 22–30)
ERYTHROCYTE [DISTWIDTH] IN BLOOD BY AUTOMATED COUNT: 2.47 M/UL (ref 3.8–5.4)
ERYTHROCYTE [DISTWIDTH] IN BLOOD: 15.2 % (ref 11.5–15.5)
GLUCOSE SERPL-MCNC: 100 MG/DL (ref 74–99)
HCT VFR BLD AUTO: 28.1 % (ref 34–46)
HGB BLD-MCNC: 9.3 GM/DL (ref 11.4–16)
LYMPHOCYTES # BLD MANUAL: 1.48 K/UL (ref 1–4.8)
MCH RBC QN AUTO: 37.8 PG (ref 25–35)
MCHC RBC AUTO-ENTMCNC: 33.2 G/DL (ref 31–37)
MCV RBC AUTO: 113.9 FL (ref 80–100)
MONOCYTES # BLD MANUAL: 0.24 K/UL (ref 0–1)
NEUTROPHILS NFR BLD MANUAL: 57 %
NEUTS SEG # BLD MANUAL: 2.28 K/UL (ref 1.3–7.7)
PH UR: 6.5 [PH] (ref 5–8)
PLATELET # BLD AUTO: 56 K/UL (ref 150–450)
POTASSIUM SERPL-SCNC: 4.1 MMOL/L (ref 3.5–5.1)
PROT SERPL-MCNC: 6.8 G/DL (ref 6.3–8.2)
RBC UR QL: 1 /HPF (ref 0–5)
SODIUM SERPL-SCNC: 134 MMOL/L (ref 137–145)
SP GR UR: 1.01 (ref 1–1.03)
SQUAMOUS UR QL AUTO: <1 /HPF (ref 0–4)
UROBILINOGEN UR QL STRIP: <2 MG/DL (ref ?–2)
WBC # BLD AUTO: 4 K/UL (ref 3.8–10.6)
WBC # UR AUTO: 5 /HPF (ref 0–5)

## 2020-03-18 RX ADMIN — CEFAZOLIN SCH MLS/HR: 330 INJECTION, POWDER, FOR SOLUTION INTRAMUSCULAR; INTRAVENOUS at 03:48

## 2020-03-18 RX ADMIN — CEFAZOLIN SCH: 330 INJECTION, POWDER, FOR SOLUTION INTRAMUSCULAR; INTRAVENOUS at 13:21

## 2020-03-18 NOTE — CT
EXAMINATION TYPE: CT chest wo con

 

DATE OF EXAM: 3/18/2020

 

COMPARISON: None

 

HISTORY: Chest/Abd pain

 

CT DLP: 310 mGycm

Automated exposure control for dose reduction was used.

 

Images were obtained from the thoracic inlet to the diaphragm with no contrast.

 

There is intrathoracic stomach with large hiatal hernia. Hernia also contains transverse colon. There
 is atelectasis in the right lower lobe adjacent to the hernia. Heart size is normal. There is no per
icardial effusion. There are small bilateral pleural effusions.

 

There is no evidence of a pulmonary mass. Thoracic aorta is atheromatous. There is no aneurysm. There
 is no mediastinal adenopathy. There are no hilar masses. There is T11 anterior wedging 50% that appe
ars old. The ribs appear intact.

 

IMPRESSION:

Large hiatal hernia with intrathoracic stomach. No pulmonary consolidation. Right lower lobe mild ate
lectasis. Small pleural effusions.

## 2020-03-18 NOTE — ED
General Adult HPI





- General


Chief complaint: Abdominal Pain


Stated complaint: Abd Pain


Time Seen by Provider: 20 23:37


Source: patient, EMS


Mode of arrival: EMS


Limitations: no limitations





- History of Present Illness


Initial comments: 


Dictation was produced using dragon dictation software. please excuse any 

grammatical, word or spelling errors. 





Chief Complaint: 86-year-old female past medical history of diverticulosis, 

gastritis, acute ischemic colitis presents with epigastric abdominal pain.





History of Present Illness: She is a 6-year-old female she has multiple 

comorbidities.  She is here today with 1 day of abdominal pain.  She localizes 

the pain to her epigastric abdominal area.  Denies any nausea or vomiting.  She 

states the pain is constant.  Patient reports he had emergency endoscopy that 

was performed approximately 2 years ago.  Patient seen at that time she 

presented initially with gastrointestinal bleeding.  She had a scope that showed

findings to suggest acute ischemic colitis of the distal colon.  Denies any 

fever, chills or night sweats.  She states the pain as dull and constant.  She 

denies any abdominal surgery.  She says her gallbladder and appendix.








The ROS documented in this emergency department record has been reviewed and 

confirmed by me.  Those systems with pertinent positive or negative responses 

have been documented in the HPI.  All other systems are other negative and/or 

noncontributory.








PHYSICAL EXAM:


General Impression: Alert and oriented x3, not in acute distress


HEENT: Normocephalic atraumatic, extra-ocular movements intact, pupils equal and

reactive to light bilaterally, mucous membranes moist.


Cardiovascular: Heart regular rate and rhythm, S1&S2 audible, no murmurs, rubs 

or gallops


Chest: Lungs clear to auscultation bilaterally, no rhonchi, no wheeze, no rales


Abdomen: Epigastric abdominal tenderness, negative David sign, no pain at 

McBurney's point, no rebound tenderness.  No tenderness to palpation left lower 

quadrant.


Musculoskeletal: Pulses present and equal in all extremities, no peripheral 

edema


Motor:  no focal deficits noted


Neurological: CN II-XII grossly intact, no focal motor or sensory deficits noted


Skin: Intact with no visualized rashes


Psych: Normal affect and mood





ED course: 86-year-old female with past medical history of acute ischemic c

olitis presents with abdominal pain to the epigastric area times one day.  Signs

upon arrival are within acceptable limits.


Laboratory evaluation obtained.  CBC is unremarkable.  Patient's levels appear 

to be baseline.  Metabolic panel shows findings within acceptable limits.  

Computed tomography scan of the abdomen and pelvis was obtained showing large 

hiatal hernia with intrathoracic colon and stomach.  Computed tomography scan of

the chest was obtained to fully visualize the extent of the hernia.  Patient 

ambulated at bedside.  She is short of breath whenever she is walking.  Given 

degree of symptoms patient be admitted with cardiothoracic consult.  Discussed 

patient case with Dr. Villegas is willing to accept patients care.

















- Related Data


                                Home Medications











 Medication  Instructions  Recorded  Confirmed


 


Allopurinol [Zyloprim] 100 mg PO DAILY 18


 


Carvedilol [Coreg] 12.5 mg PO BID 18


 


Losartan/Hydrochlorothiazide 1 tab PO DAILY 18





[Losartan-Hctz 100-12.5 mg Tab]   


 


Travon/D3/Mag11/Zinc//Alex/Bor 1 tab PO DAILY 18





[Caltrate 600+D Plus Tablet]   


 


Dorzolamide/Timolol/Pf [Cosopt Pf 1 drop RIGHT EYE BID 18





2%/5% Ophth Droperette]   


 


Latanoprost Ophth [Xalatan 0.005%] 1 drop BOTH EYES HS 18


 


Ranitidine HCl [Zantac] 150 mg PO HS 18


 


Cholecalciferol [Vitamin D3] 1,000 unit PO DAILY 19


 


Magnesium 400 mg PO BID 19


 


Timolol Maleate [Timolol Maleate 1 drop RIGHT EYE BID 19





0.5% Ophth Gel]   


 


traMADol HCL [Ultram] 50 mg PO TID PRN 19











                                    Allergies











Allergy/AdvReac Type Severity Reaction Status Date / Time


 


adhesive tape Allergy  Unknown Verified 20 23:42


 


dexamethasone Allergy  Unknown Verified 20 23:42


 


neomycin Allergy  Unknown Verified 20 23:42


 


polymyxin B Allergy  Unknown Verified 20 23:42














Review of Systems


ROS Statement: 


Those systems with pertinent positive or pertinent negative responses have been 

documented in the HPI.





ROS Other: All systems not noted in ROS Statement are negative.





Past Medical History


Past Medical History: Hypertension


Additional Past Medical History / Comment(s): glaucoma, gout, anemia


History of Any Multi-Drug Resistant Organisms: None Reported


Past Surgical History: Joint Replacement, Orthopedic Surgery


Additional Past Surgical History / Comment(s): Partial right hip replacement, 

bilateral cataract removal and intraocular lens implants


Past Psychological History: No Psychological Hx Reported


Smoking Status: Former smoker


Past Alcohol Use History: None Reported


Past Drug Use History: None Reported





- Past Family History


  ** Father


Additional Family Medical History / Comment(s): Father  in his 60s





  ** Mother


Additional Family Medical History / Comment(s): Mother  in her 90s from old 

age.  Patient has 1 sister with no major medical problems.  Patient does not 

have any brothers or children.





General Exam


Limitations: no limitations





Course


                                   Vital Signs











  20





  23:37


 


Temperature 98.1 F


 


Pulse Rate 73


 


Respiratory 16





Rate 


 


Blood Pressure 178/81


 


O2 Sat by Pulse 95





Oximetry 














Medical Decision Making





- Lab Data


Result diagrams: 


                                 20 00:05





                                 20 00:05


                                   Lab Results











  20 Range/Units





  00:05 00:05 00:05 


 


WBC  4.0    (3.8-10.6)  k/uL


 


RBC  2.47 L    (3.80-5.40)  m/uL


 


Hgb  9.3 L    (11.4-16.0)  gm/dL


 


Hct  28.1 L    (34.0-46.0)  %


 


MCV  113.9 H    (80.0-100.0)  fL


 


MCH  37.8 H    (25.0-35.0)  pg


 


MCHC  33.2    (31.0-37.0)  g/dL


 


RDW  15.2    (11.5-15.5)  %


 


Plt Count  56 L    (150-450)  k/uL


 


Neutrophils % (Manual)  57    %


 


Lymphocytes % (Manual)  37    %


 


Monocytes % (Manual)  6    %


 


Neutrophils # (Manual)  2.28    (1.3-7.7)  k/uL


 


Lymphocytes # (Manual)  1.48    (1.0-4.8)  k/uL


 


Monocytes # (Manual)  0.24    (0-1.0)  k/uL


 


Nucleated RBCs  0    (0-0)  /100 WBC


 


Manual Slide Review  Performed    


 


Large Platelets  Present    


 


Macrocytosis  Marked A    


 


Sodium   134 L   (137-145)  mmol/L


 


Potassium   4.1   (3.5-5.1)  mmol/L


 


Chloride   102   ()  mmol/L


 


Carbon Dioxide   26   (22-30)  mmol/L


 


Anion Gap   6   mmol/L


 


BUN   23 H   (7-17)  mg/dL


 


Creatinine   1.42 H   (0.52-1.04)  mg/dL


 


Est GFR (CKD-EPI)AfAm   39   (>60 ml/min/1.73 sqM)  


 


Est GFR (CKD-EPI)NonAf   34   (>60 ml/min/1.73 sqM)  


 


Glucose   100 H   (74-99)  mg/dL


 


Plasma Lactic Acid Carlos    0.9  (0.7-2.0)  mmol/L


 


Calcium   10.2   (8.4-10.2)  mg/dL


 


Total Bilirubin   0.7   (0.2-1.3)  mg/dL


 


AST   23   (14-36)  U/L


 


ALT   13   (4-34)  U/L


 


Alkaline Phosphatase   67   ()  U/L


 


Total Protein   6.8   (6.3-8.2)  g/dL


 


Albumin   3.8   (3.5-5.0)  g/dL


 


Lipase   166   ()  U/L


 


Urine Color     


 


Urine Appearance     (Clear)  


 


Urine pH     (5.0-8.0)  


 


Ur Specific Gravity     (1.001-1.035)  


 


Urine Protein     (Negative)  


 


Urine Glucose (UA)     (Negative)  


 


Urine Ketones     (Negative)  


 


Urine Blood     (Negative)  


 


Urine Nitrite     (Negative)  


 


Urine Bilirubin     (Negative)  


 


Urine Urobilinogen     (<2.0)  mg/dL


 


Ur Leukocyte Esterase     (Negative)  


 


Urine RBC     (0-5)  /hpf


 


Urine WBC     (0-5)  /hpf


 


Ur Squamous Epith Cells     (0-4)  /hpf


 


Urine Bacteria     (None)  /hpf


 


Urine Mucus     (None)  /hpf














  20 Range/Units





  01:05 


 


WBC   (3.8-10.6)  k/uL


 


RBC   (3.80-5.40)  m/uL


 


Hgb   (11.4-16.0)  gm/dL


 


Hct   (34.0-46.0)  %


 


MCV   (80.0-100.0)  fL


 


MCH   (25.0-35.0)  pg


 


MCHC   (31.0-37.0)  g/dL


 


RDW   (11.5-15.5)  %


 


Plt Count   (150-450)  k/uL


 


Neutrophils % (Manual)   %


 


Lymphocytes % (Manual)   %


 


Monocytes % (Manual)   %


 


Neutrophils # (Manual)   (1.3-7.7)  k/uL


 


Lymphocytes # (Manual)   (1.0-4.8)  k/uL


 


Monocytes # (Manual)   (0-1.0)  k/uL


 


Nucleated RBCs   (0-0)  /100 WBC


 


Manual Slide Review   


 


Large Platelets   


 


Macrocytosis   


 


Sodium   (137-145)  mmol/L


 


Potassium   (3.5-5.1)  mmol/L


 


Chloride   ()  mmol/L


 


Carbon Dioxide   (22-30)  mmol/L


 


Anion Gap   mmol/L


 


BUN   (7-17)  mg/dL


 


Creatinine   (0.52-1.04)  mg/dL


 


Est GFR (CKD-EPI)AfAm   (>60 ml/min/1.73 sqM)  


 


Est GFR (CKD-EPI)NonAf   (>60 ml/min/1.73 sqM)  


 


Glucose   (74-99)  mg/dL


 


Plasma Lactic Acid Carlos   (0.7-2.0)  mmol/L


 


Calcium   (8.4-10.2)  mg/dL


 


Total Bilirubin   (0.2-1.3)  mg/dL


 


AST   (14-36)  U/L


 


ALT   (4-34)  U/L


 


Alkaline Phosphatase   ()  U/L


 


Total Protein   (6.3-8.2)  g/dL


 


Albumin   (3.5-5.0)  g/dL


 


Lipase   ()  U/L


 


Urine Color  Light Yellow  


 


Urine Appearance  Cloudy H  (Clear)  


 


Urine pH  6.5  (5.0-8.0)  


 


Ur Specific Gravity  1.014  (1.001-1.035)  


 


Urine Protein  Negative  (Negative)  


 


Urine Glucose (UA)  Negative  (Negative)  


 


Urine Ketones  Negative  (Negative)  


 


Urine Blood  Negative  (Negative)  


 


Urine Nitrite  Positive H  (Negative)  


 


Urine Bilirubin  Negative  (Negative)  


 


Urine Urobilinogen  <2.0  (<2.0)  mg/dL


 


Ur Leukocyte Esterase  Trace H  (Negative)  


 


Urine RBC  1  (0-5)  /hpf


 


Urine WBC  5  (0-5)  /hpf


 


Ur Squamous Epith Cells  <1  (0-4)  /hpf


 


Urine Bacteria  Occasional H  (None)  /hpf


 


Urine Mucus  Rare H  (None)  /hpf














Disposition


Clinical Impression: 


 Hiatal hernia





Disposition: ADMITTED AS IP TO THIS \[facility]\""


Condition: Fair


Referrals: 


Terence Sheldon MD [Primary Care Provider] - 1-2 days


Decision Time: 01:41

## 2020-03-18 NOTE — CT
EXAMINATION TYPE: CT abdomen pelvis w con

 

DATE OF EXAM: 3/18/2020

 

COMPARISON: None

 

HISTORY: LLQ Abd pain

 

CT DLP: 1059.20 mGycm

Automated exposure control for dose reduction was used.

 

CONTRAST: 

Performed with IV Contrast, patient injected with 80 mL of Isovue 300.

 

Multiple axial sections were obtained from the diaphragm to the floor the pelvis with IV contrast.

 

There is some mild atelectasis at the right lung base adjacent to the large hiatal hernia.

 

Hernia contains stomach and transverse colon. Heart size is normal. There is no pericardial effusion.
 There are minute pleural effusions.

 

Liver and spleen appear normal. Bile ducts are not dilated. Gallbladder appears normal. There is no p
ancreatic mass.

 

There is no adrenal mass. Kidneys show satisfactory contrast opacification. There is no hydronephrosi
s. There is 1 cm cortical cyst upper pole right kidney. Ureters are not dilated. Abdominal aorta is a
theromatous. There is no retroperitoneal adenopathy.

 

There are multiple sigmoid diverticula. There is right hip prosthesis. Bladder distends smoothly. The
re is no inguinal hernia. There is no evidence of diverticulitis. There is no mesenteric edema. There
 is no ascites or free air. There is no bowel obstruction. Appendix appears normal. There is multilev
el spondylotic changes in the lumbar spine. There is 50% anterior wedging of T11 vertebra that appear
s old. There is lumbar levorotoscoliosis.

 

IMPRESSION:

Intrathoracic stomach and large hiatal hernia. Mild pleural reaction and atelectasis at the lung base
s. Old T11 compression fracture. Atherosclerotic vascular disease. No sign of acute abdomen and pelvi
s.

 

Moderate sigmoid diverticulosis without diverticulitis.

## 2020-03-18 NOTE — P.HPIM
History of Present Illness


H&P Date: 20


Chief Complaint: Abdominal pain








HISTORY AND PHYSICAL AND DISCHARGE SUMMARY: 








This is an 86-year-old  female patient of Dr. Sheldon with past history 

of hypertension, glaucoma, gout, chronic kidney disease stage IIIB chronic 

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 

ischemic colitis, gastritis, thrombocytopenia with bone marrow biopsy per Dr. Verdin, moderate to large hiatal hernia in 2018, remote history of tobacco use 

and dependence.  Patient complains of abdominal pain in the suprapubic area, she

denies nausea, vomiting, diarrhea.  No fevers or chills.  Her last bowel 

movement was normal.  Patient's last EGD and colonoscopy were 2018 with Dr. Griffiths and at that time revealed moderate to large size hiatal hernia and 

gastritis of the prepyloric area.  Colonoscopy revealed segmental colitis of the

sigmoid and descending colon consistent with acute ischemic colitis status post 

multiple biopsies.  Biopsies confirmed chronic gastritis, acute colitis.  H. 

pylori was negative and no malignant cells were identified.





Patient came into [facility] emergency center for evaluation.  

WBC 4.0, hemoglobin 9.3, platelet count 56.  Sodium 134, potassium 4.1, chloride

102, CO2 26, BUN 23 and creatinine 1.42.  Blood sugar 100.  Liver function tests

within normal limits.  Lipase 166.  Urinalysis cloudy, nitrate positive, 

leukoesterase trace.  CAT scan of the chest revealed a large hiatal hernia with 

intrathoracic stomach.  No pulmonary consolidation.  Right lower lobe mild 

atelectasis.  Small pleural effusions.  CAT scan of the abdomen and pelvis 

revealed the same with mild pleural reaction atelectasis at the lung bases.  Old

T11 compression fracture.  No sign of acute abdomen and pelvis.  Moderate 

sigmoid diverticulosis without diverticulitis.  Patient was admitted to the 

[facility] floor and consult with cardiothoracic surgery.  At this time the patient

did not have significant pain to warrant any surgical intervention.  Patient 

will be treated for UTI and discharged home today in stable condition.





Review of Systems


Constitutional: Denies chills, Denies fatigue, Denies fever, Denies lethargy, 

Denies malaise, Denies poor appetite, Denies weight loss


Eyes: denies blurred vision, denies pain


Ears, nose, mouth and throat: Denies dysphagia, Denies headache, Denies nasal 

congestion, Denies nasal discharge, Denies sore throat, Denies vertigo


Cardiovascular: Denies chest pain, Denies decreased exercise tolerance, Denies 

dyspnea on exertion, Denies edema, Denies leg edema, Denies lightheadedness, 

Denies shortness of breath, Denies syncope


Respiratory: Denies cough, Denies cough with sputum, Denies dyspnea, Denies 

excessive sputum, Denies hemoptysis, Denies home oxygen, Denies respiratory 

infections, Denies sleep apnea, Denies wheezing


Gastrointestinal: Reports abdominal pain, Denies diarrhea, Denies loss of 

appetite, Denies nausea, Denies vomiting


Genitourinary: Denies dysuria, Denies hematuria


Musculoskeletal: Denies frequent falls, Denies gait dysfunction, Denies muscle 

weakness, Denies myalgias


Integumentary: Denies pruritus, Denies rash


Neurological: Denies change in mentation, Denies change in speech, Denies 

numbness, Denies weakness


Psychiatric: Denies anxiety, Denies depression


Endocrine: Denies fatigue, Denies weight change





Past Medical History


Past Medical History: Hypertension


Additional Past Medical History / Comment(s): glaucoma, gout, anemia, thrombocyt

openia, diverticulosis, ischemic colitis


History of Any Multi-Drug Resistant Organisms: None Reported


Past Surgical History: Joint Replacement, Orthopedic Surgery


Additional Past Surgical History / Comment(s): Partial right hip replacement, 

bilateral cataract removal and intraocular lens implants


Past Psychological History: No Psychological Hx Reported


Smoking Status: Former smoker


Past Alcohol Use History: None Reported


Additional Past Alcohol Use History / Comment(s): Patient was a smoker one pack 

per day for 40 years ago 10 years ago.  She drinks alcohol rarely.  She denies 

any marijuana or street drug use.  She does not have CPAP, oxygen or nebulizer 

at home.


Past Drug Use History: None Reported





- Past Family History


  ** Father


Additional Family Medical History / Comment(s): Father  in his 60s





  ** Mother


Additional Family Medical History / Comment(s): Mother  in her 90s from old 

age.  Patient has 1 sister with no major medical problems.  Patient does not 

have any brothers or children.





Medications and Allergies


                                Home Medications











 Medication  Instructions  Recorded  Confirmed  Type


 


Allopurinol [Zyloprim] 100 mg PO DAILY 18 History


 


Carvedilol [Coreg] 12.5 mg PO BID 18 History


 


Travon/D3/Mag11/Zinc//Alex/Bor 1 tab PO DAILY 18 History





[Caltrate 600+D Plus Tablet]    


 


Dorzolamide/Timolol/Pf [Cosopt Pf 1 drop RIGHT EYE BID 18 History





2%/5% Ophth Droperette]    


 


Latanoprost Ophth [Xalatan 0.005%] 1 drop BOTH EYES HS 18 History


 


Cholecalciferol [Vitamin D3] 1,000 unit PO DAILY 19 History


 


Magnesium 400 mg PO BID 19 History


 


traMADol HCL [Ultram] 50 mg PO TID PRN 19 History


 


Cephalexin [Keflex] 250 mg PO Q12HR #6 capsule 20  Rx


 


Midodrine [ProAmatine] 5 mg PO BID PRN 20 History








                                    Allergies











Allergy/AdvReac Type Severity Reaction Status Date / Time


 


adhesive tape Allergy  Unknown Verified 20 09:42


 


dexamethasone Allergy  Unknown Verified 20 09:42


 


neomycin Allergy  Unknown Verified 20 09:42


 


polymyxin B Allergy  Unknown Verified 20 09:42














Physical Exam


Vitals: 


                                   Vital Signs











  Temp Pulse Pulse Resp BP BP Pulse Ox


 


 20 05:09  97.9 F   73  16   134/77  91 L


 


 20 03:33  98.0 F   71  20   159/85  94 L


 


 20 03:25  98.0 F   71  20   159/85  94 L


 


 20 03:06  98.1 F  69   18  133/66   98


 


 20 02:02   63   18  141/64   97


 


 20 23:37  98.1 F  73   16  178/81   95








                                Intake and Output











 20





 22:59 06:59 14:59


 


Intake Total  400 


 


Balance  400 


 


Intake:   


 


  Amount of Fluid Infused (  400 





  ml)   


 


  Oral  0 


 


Other:   


 


  Voiding Method  Toilet 


 


  Weight  76.657 kg 














Gen: This is an 86-year-old  female.  She is sitting up in bed and 

appears to be comfortable and in no acute distress.


HEENT: Head is atraumatic, normocephalic. Pupils equal, round. Sclerae is 

anicteric. 


NECK: Supple. No JVD. No lymphadenopathy. No thyromegaly. 


LUNGS: Clear to auscultation. No wheezes or rhonchi.  No intercostal 

retractions.


HEART: Regular rate and rhythm. No murmur. 


ABDOMEN: Soft. Bowel sounds are present. No masses.  Mild suprapubic tenderness.


EXTREMITIES: No pedal edema.  No calf tenderness.


NEUROLOGICAL: Patient is awake, alert and oriented x3. Cranial nerves 2 through 

12 are grossly intact. 








Results


CBC & Chem 7: 


                                 20 00:05





                                 20 00:05


Labs: 


                  Abnormal Lab Results - Last 24 Hours (Table)











  20 Range/Units





  00:05 00:05 01:05 


 


RBC  2.47 L    (3.80-5.40)  m/uL


 


Hgb  9.3 L    (11.4-16.0)  gm/dL


 


Hct  28.1 L    (34.0-46.0)  %


 


MCV  113.9 H    (80.0-100.0)  fL


 


MCH  37.8 H    (25.0-35.0)  pg


 


Plt Count  56 L    (150-450)  k/uL


 


Macrocytosis  Marked A    


 


Sodium   134 L   (137-145)  mmol/L


 


BUN   23 H   (7-17)  mg/dL


 


Creatinine   1.42 H   (0.52-1.04)  mg/dL


 


Glucose   100 H   (74-99)  mg/dL


 


Urine Appearance    Cloudy H  (Clear)  


 


Urine Nitrite    Positive H  (Negative)  


 


Ur Leukocyte Esterase    Trace H  (Negative)  


 


Urine Bacteria    Occasional H  (None)  /hpf


 


Urine Mucus    Rare H  (None)  /hpf














Thrombosis Risk Factor Assmnt





- Choose All That Apply


Any of the Below Risk Factors Present?: Yes


Each Factor Represents 1 point: Obesity (BMI >25)


Each Risk Factor Represents 3 Points: Age 75 years or older


Other congenital or acquired thrombophilia - If yes, enter type in comment: No


Thrombosis Risk Factor Assessment Total Risk Factor Score: 4


Thrombosis Risk Factor Assessment Level: Moderate Risk





Assessment and Plan


Plan: 





1.  Acute abdominal pain most likely secondary to urinary tract infection.





2.  Intrathoracic stomach found on CAT scan with history of large hiatal hernia 

on last EGD.  Cardiothoracic consult appreciated.  No plan for surgical 

intervention.





3.  History of lower GI bleed.  





2.  Chronic thrombocytopenia under the care of oncology.  





5.  Glaucoma.  





6.  Hypertension.  





7.  Gout, chronic.  





8.  Chronic kidney disease stage 3b.  





Patient placed as an observation status.





Discharge plan: home





Discharge Medication List


Allopurinol [Zyloprim] 100 mg PO DAILY 18 [History]


Carvedilol [Coreg] 12.5 mg PO BID 18 [History]


Travon/D3/Mag11/Zinc//Alex/Bor [Caltrate 600+D Plus Tablet] 1 tab PO DAILY 

18 [History]


Dorzolamide/Timolol/Pf [Cosopt Pf 2%/5% Ophth Droperette] 1 drop RIGHT EYE BID 

18 [History]


Latanoprost Ophth [Xalatan 0.005%] 1 drop BOTH EYES HS 18 [History]


Cholecalciferol [Vitamin D3] 1,000 unit PO DAILY 19 [History]


Magnesium 400 mg PO BID 19 [History]


traMADol HCL [Ultram] 50 mg PO TID PRN 19 [History]


Cephalexin [Keflex] 250 mg PO Q12HR #6 capsule 20 [Rx]


Midodrine [ProAmatine] 5 mg PO BID PRN 20 [History]





Impression and plan of care have been directed as dictated by the signing 

physician.  Salima Bergeron nurse practitioner acting as scribe for signing 

physician.

## 2020-03-18 NOTE — P.GSCN
History of Present Illness


Consult date: 20


Reason for Consult: 





Diaphragmatic hernia


Requesting physician: Best Petersen


History of present illness: 





This is an 86-year-old female who follows on an outpatient basis with Dr. Sheldon.

 She walks with a walker and lives at [facility].  She has a 

previous medical history of chronic anemia, thrombocytopenia with bone marrow 

biopsy per Dr. Solorzano, known moderate to large hiatal hernia as far back as 2018,

chronic kidney disease with baseline creatinine 1.4-1.6, hypertension, gout, 

ischemic colitis, diverticulosis, and previous tobacco dependence.  She 

presented to [facility] emergency room this morning with complaints of 

abdominal pain for the previous 2 days.  She describes the pain as an 

intermittent dull ache, at its worst she rated it 2 out of 10.  She states the 

aching started around mid abdomen close to a surgical scar and has moved up to 

the epigastric area.  She denies any nausea, vomiting, diarrhea, fevers.  Last 

bowel movement was yesterday and she reports it as being normal.  Last meal was 

yesterday in the afternoon.  In the emergency room her lab work was unremarkable

except for a hemoglobin of 9.3, platelet count 56, BUN 23, creatinine 1.42.  

Urinalysis was cloudy and positive for nitrites and trace leukocyte esterase.  

Abdomen/pelvis CT demonstrated a large hiatal hernia with intrathoracic stomach.

 CT of the chest was also completed demonstrating the same along with small 

bilateral pleural effusions.  EKG demonstrated normal sinus rhythm.  Due to her 

CT findings and symptomatology Dr. Polanco from cardiothoracic surgery was 

consulted for surgical recommendations.





Review of Systems





Review of systems was completed and was negative except as noted





- Gastrointestinal


Reports as per HPI, Reports abdominal pain





Past Medical History


Past Medical History: Hypertension


Additional Past Medical History / Comment(s): glaucoma, gout, anemia, 

thrombocytopenia, diverticulosis, ischemic colitis, chronic kidney disease


History of Any Multi-Drug Resistant Organisms: None Reported


Past Surgical History: Joint Replacement, Orthopedic Surgery


Additional Past Surgical History / Comment(s): Partial right hip replacement, 

bilateral cataract removal and intraocular lens implants, abdominal surgery


Past Psychological History: No Psychological Hx Reported


Smoking Status: Former smoker


Past Alcohol Use History: None Reported


Additional Past Alcohol Use History / Comment(s): Patient was a smoker one pack 

per day for 40 years ago 10 years ago.  She drinks alcohol rarely.  She denies 

any marijuana or street drug use.  She does not have CPAP, oxygen or nebulizer 

at home.


Past Drug Use History: None Reported





- Past Family History


  ** Father


Additional Family Medical History / Comment(s): Father  in his 60s





  ** Mother


Additional Family Medical History / Comment(s): Mother  in her 90s from old 

age.  Patient has 1 sister with no major medical problems.  Patient does not 

have any brothers or children.





Medications and Allergies


                                Home Medications











 Medication  Instructions  Recorded  Confirmed  Type


 


Allopurinol [Zyloprim] 100 mg PO DAILY 18 History


 


Carvedilol [Coreg] 12.5 mg PO BID 18 History


 


Losartan/Hydrochlorothiazide 1 tab PO DAILY 18 History





[Losartan-Hctz 100-12.5 mg Tab]    


 


Travon/D3/Mag11/Zinc//Alex/Bor 1 tab PO DAILY 18 History





[Caltrate 600+D Plus Tablet]    


 


Dorzolamide/Timolol/Pf [Cosopt Pf 1 drop RIGHT EYE BID 18 History





2%/5% Ophth Droperette]    


 


Latanoprost Ophth [Xalatan 0.005%] 1 drop BOTH EYES HS 18 History


 


Ranitidine HCl [Zantac] 150 mg PO HS 18 History


 


Cholecalciferol [Vitamin D3] 1,000 unit PO DAILY 19 History


 


Magnesium 400 mg PO BID 19 History


 


Timolol Maleate [Timolol Maleate 1 drop RIGHT EYE BID 19 History





0.5% Ophth Gel]    


 


traMADol HCL [Ultram] 50 mg PO TID PRN 19 History








                                    Allergies











Allergy/AdvReac Type Severity Reaction Status Date / Time


 


adhesive tape Allergy  Unknown Verified 20 23:42


 


dexamethasone Allergy  Unknown Verified 20 23:42


 


neomycin Allergy  Unknown Verified 20 23:42


 


polymyxin B Allergy  Unknown Verified 20 23:42














Surgical - Exam


                                   Vital Signs











Temp Pulse Resp BP Pulse Ox


 


 98.1 F   73   16   178/81   95 


 


 03/17/20 23:37  20 23:37  20 23:37  20 23:37  20 23:37














- General


well developed, well nourished, no distress, no pain





- Eyes


normal ocular movement





- ENT


no hearing loss





- Neck


no masses, no bruits, trachea midline





- Respiratory





Lungs sounds clear bilaterally.  Respirations even, nonlabored.  Currently on 2 

L nasal cannula with oxygen saturation 91-94%.  No clubbing or cyanosis present.





- Cardiovascular





S1, S2 present.  Regular rate and rhythm.  Palpable peripheral pulses 

bilaterally.  Trace bilateral lower extremity edema present.  No calf pain or 

tenderness noted.





- Abdomen


Abdomen: soft, non tender, bowel sounds, surgical scars





- Genitourinary





Deferred





- Rectum





Deferred





- Integumentary


no rash, no growths





- Neurologic


normal coordination, normal sensation





- Musculoskeletal


normal posture





- Psychiatric


oriented to time, oriented to person, oriented to place





Results





- Labs





                                 20 00:05





                                 20 00:05


                  Abnormal Lab Results - Last 24 Hours (Table)











  20 Range/Units





  00:05 00:05 01:05 


 


RBC  2.47 L    (3.80-5.40)  m/uL


 


Hgb  9.3 L    (11.4-16.0)  gm/dL


 


Hct  28.1 L    (34.0-46.0)  %


 


MCV  113.9 H    (80.0-100.0)  fL


 


MCH  37.8 H    (25.0-35.0)  pg


 


Plt Count  56 L    (150-450)  k/uL


 


Macrocytosis  Marked A    


 


Sodium   134 L   (137-145)  mmol/L


 


BUN   23 H   (7-17)  mg/dL


 


Creatinine   1.42 H   (0.52-1.04)  mg/dL


 


Glucose   100 H   (74-99)  mg/dL


 


Urine Appearance    Cloudy H  (Clear)  


 


Urine Nitrite    Positive H  (Negative)  


 


Ur Leukocyte Esterase    Trace H  (Negative)  


 


Urine Bacteria    Occasional H  (None)  /hpf


 


Urine Mucus    Rare H  (None)  /hpf








                                 Diabetes panel











  20 Range/Units





  00:05 


 


Sodium  134 L  (137-145)  mmol/L


 


Potassium  4.1  (3.5-5.1)  mmol/L


 


Chloride  102  ()  mmol/L


 


Carbon Dioxide  26  (22-30)  mmol/L


 


BUN  23 H  (7-17)  mg/dL


 


Creatinine  1.42 H  (0.52-1.04)  mg/dL


 


Glucose  100 H  (74-99)  mg/dL


 


Calcium  10.2  (8.4-10.2)  mg/dL


 


AST  23  (14-36)  U/L


 


ALT  13  (4-34)  U/L


 


Alkaline Phosphatase  67  ()  U/L


 


Total Protein  6.8  (6.3-8.2)  g/dL


 


Albumin  3.8  (3.5-5.0)  g/dL








                                  Calcium panel











  20 Range/Units





  00:05 


 


Calcium  10.2  (8.4-10.2)  mg/dL


 


Albumin  3.8  (3.5-5.0)  g/dL








                                 Pituitary panel











  20 Range/Units





  00:05 


 


Sodium  134 L  (137-145)  mmol/L


 


Potassium  4.1  (3.5-5.1)  mmol/L


 


Chloride  102  ()  mmol/L


 


Carbon Dioxide  26  (22-30)  mmol/L


 


BUN  23 H  (7-17)  mg/dL


 


Creatinine  1.42 H  (0.52-1.04)  mg/dL


 


Glucose  100 H  (74-99)  mg/dL


 


Calcium  10.2  (8.4-10.2)  mg/dL








                                  Adrenal panel











  20 Range/Units





  00:05 


 


Sodium  134 L  (137-145)  mmol/L


 


Potassium  4.1  (3.5-5.1)  mmol/L


 


Chloride  102  ()  mmol/L


 


Carbon Dioxide  26  (22-30)  mmol/L


 


BUN  23 H  (7-17)  mg/dL


 


Creatinine  1.42 H  (0.52-1.04)  mg/dL


 


Glucose  100 H  (74-99)  mg/dL


 


Calcium  10.2  (8.4-10.2)  mg/dL


 


Total Bilirubin  0.7  (0.2-1.3)  mg/dL


 


AST  23  (14-36)  U/L


 


ALT  13  (4-34)  U/L


 


Alkaline Phosphatase  67  ()  U/L


 


Total Protein  6.8  (6.3-8.2)  g/dL


 


Albumin  3.8  (3.5-5.0)  g/dL














- Imaging


CT scan - abdomen: report reviewed, image reviewed


CT scan - chest: report reviewed, image reviewed


CT scan - pelvis: report reviewed, image reviewed


EKG: image reviewed





Assessment and Plan


Assessment: 





1.  Abdominal pain, hiatal hernia on CT, known history of moderate to large 

hiatal hernia as far back as 2018


2.  Chronic anemia


3.  Chronic thrombocytopenia with bone marrow biopsy in the past, results unk

nown, patient does follow with Dr. Solorzano


4.  Hypertension


5.  Chronic kidney disease


6.  Gout


7.  History of ischemic colitis


8.  Diverticulosis


9.  Previous tobacco dependence





Plan: 





The patient was seen and examined at the bedside with Dr. Polanco.  

Chart/diagnostics were reviewed.  At this time the patient does not complain of 

any pain per se, but does state she just feels something is there in her 

epigastric area.  With palpation the patient states she is aware that I'm 

pushing on her belly but she does not appear to be in any significant distress. 

 She does appear to have a chronic paraesophageal hernia with low risk for 

tortion or ischemia.  The hernia has been present for some time, although extent

 of increase in size is not clear given no previous CTs in our sytem.  Abdominal

 aortic atherosclerosis is present, uncertain if there is any relation to her ab

dominal discomfort.  She reports some intermittent shortness of breath for quite

 a while, unsure of it's relation to current illness. There is no need for 

urgent or emergent surgery.  In fact, she would be high risk for paraesophageal 

hernia repair given her increased age, atherosclerosis, and previous abdominal 

surgery which would need to be balanced with the benefits of surgery.  She may 

follow up with us on an outpatient basis.  Management of other medical 

comorbidities per primary care service.





Thank you for allowing us to participate in the care of this patient.


Time with Patient: Greater than 30

## 2021-04-19 ENCOUNTER — HOSPITAL ENCOUNTER (OUTPATIENT)
Dept: HOSPITAL 47 - EC | Age: 86
Setting detail: OBSERVATION
LOS: 4 days | Discharge: NURSING, REHAB OR LONG-TERM CARE | End: 2021-04-23
Attending: INTERNAL MEDICINE | Admitting: INTERNAL MEDICINE
Payer: MEDICARE

## 2021-04-19 DIAGNOSIS — R06.02: ICD-10-CM

## 2021-04-19 DIAGNOSIS — D69.59: ICD-10-CM

## 2021-04-19 DIAGNOSIS — R53.1: ICD-10-CM

## 2021-04-19 DIAGNOSIS — Z82.49: ICD-10-CM

## 2021-04-19 DIAGNOSIS — K44.9: ICD-10-CM

## 2021-04-19 DIAGNOSIS — H40.9: ICD-10-CM

## 2021-04-19 DIAGNOSIS — M19.90: ICD-10-CM

## 2021-04-19 DIAGNOSIS — D61.818: ICD-10-CM

## 2021-04-19 DIAGNOSIS — R05: ICD-10-CM

## 2021-04-19 DIAGNOSIS — Z87.11: ICD-10-CM

## 2021-04-19 DIAGNOSIS — K55.039: ICD-10-CM

## 2021-04-19 DIAGNOSIS — K29.70: ICD-10-CM

## 2021-04-19 DIAGNOSIS — M20.42: ICD-10-CM

## 2021-04-19 DIAGNOSIS — N18.32: ICD-10-CM

## 2021-04-19 DIAGNOSIS — M20.41: ICD-10-CM

## 2021-04-19 DIAGNOSIS — Z87.19: ICD-10-CM

## 2021-04-19 DIAGNOSIS — Z96.643: ICD-10-CM

## 2021-04-19 DIAGNOSIS — D47.2: ICD-10-CM

## 2021-04-19 DIAGNOSIS — M20.12: ICD-10-CM

## 2021-04-19 DIAGNOSIS — Z88.1: ICD-10-CM

## 2021-04-19 DIAGNOSIS — D51.9: ICD-10-CM

## 2021-04-19 DIAGNOSIS — M20.11: ICD-10-CM

## 2021-04-19 DIAGNOSIS — Z96.1: ICD-10-CM

## 2021-04-19 DIAGNOSIS — I73.9: ICD-10-CM

## 2021-04-19 DIAGNOSIS — I13.10: ICD-10-CM

## 2021-04-19 DIAGNOSIS — K21.9: ICD-10-CM

## 2021-04-19 DIAGNOSIS — Z79.891: ICD-10-CM

## 2021-04-19 DIAGNOSIS — Z87.891: ICD-10-CM

## 2021-04-19 DIAGNOSIS — D75.89: ICD-10-CM

## 2021-04-19 DIAGNOSIS — M1A.9XX0: ICD-10-CM

## 2021-04-19 DIAGNOSIS — Z20.822: ICD-10-CM

## 2021-04-19 DIAGNOSIS — B35.1: ICD-10-CM

## 2021-04-19 DIAGNOSIS — R32: ICD-10-CM

## 2021-04-19 DIAGNOSIS — D46.9: ICD-10-CM

## 2021-04-19 DIAGNOSIS — N39.0: Primary | ICD-10-CM

## 2021-04-19 DIAGNOSIS — Z88.8: ICD-10-CM

## 2021-04-19 DIAGNOSIS — Z91.048: ICD-10-CM

## 2021-04-19 DIAGNOSIS — Z79.899: ICD-10-CM

## 2021-04-19 LAB
ALBUMIN SERPL-MCNC: 3.5 G/DL (ref 3.5–5)
ALP SERPL-CCNC: 65 U/L (ref 38–126)
ALT SERPL-CCNC: 11 U/L (ref 4–34)
ANION GAP SERPL CALC-SCNC: 3 MMOL/L
APTT BLD: 20.4 SEC (ref 22–30)
AST SERPL-CCNC: 27 U/L (ref 14–36)
BASOPHILS # BLD AUTO: 0 K/UL (ref 0–0.2)
BASOPHILS NFR BLD AUTO: 0 %
BUN SERPL-SCNC: 21 MG/DL (ref 7–17)
CALCIUM SPEC-MCNC: 9.7 MG/DL (ref 8.4–10.2)
CHLORIDE SERPL-SCNC: 102 MMOL/L (ref 98–107)
CO2 SERPL-SCNC: 28 MMOL/L (ref 22–30)
EOSINOPHIL # BLD AUTO: 0 K/UL (ref 0–0.7)
EOSINOPHIL NFR BLD AUTO: 0 %
ERYTHROCYTE [DISTWIDTH] IN BLOOD BY AUTOMATED COUNT: 2.74 M/UL (ref 3.8–5.4)
ERYTHROCYTE [DISTWIDTH] IN BLOOD: 14.2 % (ref 11.5–15.5)
GLUCOSE SERPL-MCNC: 97 MG/DL (ref 74–99)
HCT VFR BLD AUTO: 30.3 % (ref 34–46)
HGB BLD-MCNC: 10.5 GM/DL (ref 11.4–16)
HYALINE CASTS UR QL AUTO: 3 /LPF (ref 0–2)
INR PPP: 1 (ref ?–1.2)
LDH SPEC-CCNC: 455 U/L (ref 313–618)
LYMPHOCYTES # SPEC AUTO: 0.6 K/UL (ref 1–4.8)
LYMPHOCYTES NFR SPEC AUTO: 14 %
MAGNESIUM SPEC-SCNC: 1.7 MG/DL (ref 1.6–2.3)
MCH RBC QN AUTO: 38.3 PG (ref 25–35)
MCHC RBC AUTO-ENTMCNC: 34.6 G/DL (ref 31–37)
MCV RBC AUTO: 110.7 FL (ref 80–100)
MONOCYTES # BLD AUTO: 0.3 K/UL (ref 0–1)
MONOCYTES NFR BLD AUTO: 8 %
NEUTROPHILS # BLD AUTO: 3 K/UL (ref 1.3–7.7)
NEUTROPHILS NFR BLD AUTO: 75 %
PH UR: 6 [PH] (ref 5–8)
PLATELET # BLD AUTO: 42 K/UL (ref 150–450)
POTASSIUM SERPL-SCNC: 4.3 MMOL/L (ref 3.5–5.1)
PROT SERPL-MCNC: 6.4 G/DL (ref 6.3–8.2)
PROT UR QL: (no result)
PT BLD: 10.5 SEC (ref 9–12)
RBC UR QL: 5 /HPF (ref 0–5)
SODIUM SERPL-SCNC: 133 MMOL/L (ref 137–145)
SP GR UR: 1.02 (ref 1–1.03)
SQUAMOUS UR QL AUTO: 7 /HPF (ref 0–4)
UROBILINOGEN UR QL STRIP: <2 MG/DL (ref ?–2)
WBC # BLD AUTO: 4 K/UL (ref 3.8–10.6)
WBC # UR AUTO: 74 /HPF (ref 0–5)

## 2021-04-19 PROCEDURE — 36415 COLL VENOUS BLD VENIPUNCTURE: CPT

## 2021-04-19 PROCEDURE — 97161 PT EVAL LOW COMPLEX 20 MIN: CPT

## 2021-04-19 PROCEDURE — 85730 THROMBOPLASTIN TIME PARTIAL: CPT

## 2021-04-19 PROCEDURE — 96361 HYDRATE IV INFUSION ADD-ON: CPT

## 2021-04-19 PROCEDURE — 83735 ASSAY OF MAGNESIUM: CPT

## 2021-04-19 PROCEDURE — 82784 ASSAY IGA/IGD/IGG/IGM EACH: CPT

## 2021-04-19 PROCEDURE — 83540 ASSAY OF IRON: CPT

## 2021-04-19 PROCEDURE — 85045 AUTOMATED RETICULOCYTE COUNT: CPT

## 2021-04-19 PROCEDURE — 83921 ORGANIC ACID SINGLE QUANT: CPT

## 2021-04-19 PROCEDURE — 87077 CULTURE AEROBIC IDENTIFY: CPT

## 2021-04-19 PROCEDURE — 97166 OT EVAL MOD COMPLEX 45 MIN: CPT

## 2021-04-19 PROCEDURE — 83550 IRON BINDING TEST: CPT

## 2021-04-19 PROCEDURE — 85025 COMPLETE CBC W/AUTO DIFF WBC: CPT

## 2021-04-19 PROCEDURE — 94760 N-INVAS EAR/PLS OXIMETRY 1: CPT

## 2021-04-19 PROCEDURE — 97116 GAIT TRAINING THERAPY: CPT

## 2021-04-19 PROCEDURE — 87635 SARS-COV-2 COVID-19 AMP PRB: CPT

## 2021-04-19 PROCEDURE — 82746 ASSAY OF FOLIC ACID SERUM: CPT

## 2021-04-19 PROCEDURE — 93005 ELECTROCARDIOGRAM TRACING: CPT

## 2021-04-19 PROCEDURE — 82728 ASSAY OF FERRITIN: CPT

## 2021-04-19 PROCEDURE — 71045 X-RAY EXAM CHEST 1 VIEW: CPT

## 2021-04-19 PROCEDURE — 87086 URINE CULTURE/COLONY COUNT: CPT

## 2021-04-19 PROCEDURE — 81001 URINALYSIS AUTO W/SCOPE: CPT

## 2021-04-19 PROCEDURE — 80048 BASIC METABOLIC PNL TOTAL CA: CPT

## 2021-04-19 PROCEDURE — 85384 FIBRINOGEN ACTIVITY: CPT

## 2021-04-19 PROCEDURE — 80053 COMPREHEN METABOLIC PANEL: CPT

## 2021-04-19 PROCEDURE — 86140 C-REACTIVE PROTEIN: CPT

## 2021-04-19 PROCEDURE — 96366 THER/PROPH/DIAG IV INF ADDON: CPT

## 2021-04-19 PROCEDURE — 97535 SELF CARE MNGMENT TRAINING: CPT

## 2021-04-19 PROCEDURE — 83615 LACTATE (LD) (LDH) ENZYME: CPT

## 2021-04-19 PROCEDURE — 84165 PROTEIN E-PHORESIS SERUM: CPT

## 2021-04-19 PROCEDURE — 83605 ASSAY OF LACTIC ACID: CPT

## 2021-04-19 PROCEDURE — 83883 ASSAY NEPHELOMETRY NOT SPEC: CPT

## 2021-04-19 PROCEDURE — 85610 PROTHROMBIN TIME: CPT

## 2021-04-19 PROCEDURE — 97530 THERAPEUTIC ACTIVITIES: CPT

## 2021-04-19 PROCEDURE — 99285 EMERGENCY DEPT VISIT HI MDM: CPT

## 2021-04-19 PROCEDURE — 87040 BLOOD CULTURE FOR BACTERIA: CPT

## 2021-04-19 PROCEDURE — 87186 SC STD MICRODIL/AGAR DIL: CPT

## 2021-04-19 PROCEDURE — 86334 IMMUNOFIX E-PHORESIS SERUM: CPT

## 2021-04-19 PROCEDURE — 82607 VITAMIN B-12: CPT

## 2021-04-19 PROCEDURE — 96365 THER/PROPH/DIAG IV INF INIT: CPT

## 2021-04-19 PROCEDURE — 84145 PROCALCITONIN (PCT): CPT

## 2021-04-19 NOTE — ED
General Adult HPI





- General


Chief complaint: Fever


Stated complaint: SOB


Time Seen by Provider: 21 17:20


Source: patient, EMS, RN notes reviewed


Mode of arrival: EMS


Limitations: no limitations





- History of Present Illness


Initial comments: 





Patient is a pleasant 87-year-old female presenting to the emergency Department 

with complaints of fever.  Onset of symptoms was 2-3 days ago.  Patient does 

complain of cough with mild shortness of breath.  No dysuria or abdominal pain. 

Patient does feel generally weak and achy.  No isolated area of weakness.  





- Related Data


                                Home Medications











 Medication  Instructions  Recorded  Confirmed


 


Carvedilol [Coreg] 12.5 mg PO BID 18


 


allopurinoL [Zyloprim] 100 mg PO DAILY 18


 


Latanoprost Ophth [Xalatan 0.005%] 1 drop BOTH EYES HS 18


 


traMADol HCL [Ultram] 50 mg PO TID PRN 19


 


Dorzolamide-Timol 2.23%/0.68% 1 drop RIGHT EYE BID 21





[Cosopt]   


 


Famotidine [Pepcid] 20 mg PO DAILY 21


 


Metoclopramide [Reglan] 5 mg PO BID-W/MEALS PRN 21


 


Metoclopramide [Reglan] 5 mg PO HS PRN 21











                                    Allergies











Allergy/AdvReac Type Severity Reaction Status Date / Time


 


adhesive tape Allergy  Unknown Verified 21 19:23


 


dexamethasone Allergy  Unknown Verified 21 19:23


 


neomycin Allergy  Unknown Verified 21 19:23


 


polymyxin B Allergy  Unknown Verified 21 19:23














Review of Systems


ROS Statement: 


Those systems with pertinent positive or pertinent negative responses have been 

documented in the HPI.





ROS Other: All systems not noted in ROS Statement are negative.


Constitutional: Reports: fever, chills


Eyes: Denies: eye pain


ENT: Denies: ear pain


Respiratory: Reports: cough, dyspnea


Cardiovascular: Denies: chest pain


Endocrine: Reports: fatigue


Gastrointestinal: Denies: abdominal pain


Genitourinary: Denies: dysuria


Musculoskeletal: Denies: back pain


Skin: Denies: rash


Neurological: Denies: headache





Past Medical History


Past Medical History: Hypertension


Additional Past Medical History / Comment(s): glaucoma, gout, anemia, 

thrombocytopenia, diverticulosis, ischemic colitis


History of Any Multi-Drug Resistant Organisms: None Reported


Past Surgical History: Joint Replacement, Orthopedic Surgery


Additional Past Surgical History / Comment(s): Partial right hip replacement, 

bilateral cataract removal and intraocular lens implants


Past Psychological History: No Psychological Hx Reported


Smoking Status: Former smoker


Past Alcohol Use History: None Reported


Past Drug Use History: None Reported





- Past Family History


  ** Father


Additional Family Medical History / Comment(s): Father  in his 60s





  ** Mother


Additional Family Medical History / Comment(s): Mother  in her 90s from old 

age.  Patient has 1 sister with no major medical problems.  Patient does not 

have any brothers or children.





General Exam


Limitations: no limitations


General appearance: alert, in no apparent distress


Head exam: Present: normocephalic


Eye exam: Present: normal appearance, PERRL


ENT exam: Present: normal exam


Neck exam: Present: normal inspection.  Absent: meningismus


Respiratory exam: Present: normal lung sounds bilaterally


Cardiovascular Exam: Present: regular rate, normal rhythm


GI/Abdominal exam: Present: soft.  Absent: tenderness


Extremities exam: Present: normal inspection.  Absent: pedal edema, calf 

tenderness


Back exam: Present: normal inspection.  Absent: tenderness


Neurological exam: Present: alert.  Absent: motor sensory deficit


Psychiatric exam: Present: normal affect, normal mood


Skin exam: Present: normal color





Course


                                   Vital Signs











  21





  17:20 18:31 19:46


 


Temperature 101.7 F H  99.8 F H


 


Pulse Rate 75 72 71


 


Respiratory 18 18 18





Rate   


 


Blood Pressure 144/65 144/63 134/74


 


O2 Sat by Pulse 98 99 97





Oximetry   














  21





  21:33


 


Temperature 


 


Pulse Rate 71


 


Respiratory 18





Rate 


 


Blood Pressure 136/74


 


O2 Sat by Pulse 98





Oximetry 














EKG Findings





- EKG Comments:


EKG Findings:: Sinus rhythm with rate of 72.  AZ 14.  QRS 72.  .  .

  Normal axis.  Normal QRS.  No acute ST change.





Medical Decision Making





- Medical Decision Making





Patient reevaluated and updated.  Case was discussed Dr. Sheldon who would like 

his patient admitted secondary to her history.





- Lab Data


Result diagrams: 


                                 21 18:32





                                 21 18:32


                                   Lab Results











  21 Range/Units





  18:32 18:32 18:32 


 


WBC  4.0    (3.8-10.6)  k/uL


 


RBC  2.74 L    (3.80-5.40)  m/uL


 


Hgb  10.5 L    (11.4-16.0)  gm/dL


 


Hct  30.3 L    (34.0-46.0)  %


 


MCV  110.7 H    (80.0-100.0)  fL


 


MCH  38.3 H    (25.0-35.0)  pg


 


MCHC  34.6    (31.0-37.0)  g/dL


 


RDW  14.2    (11.5-15.5)  %


 


Plt Count  42 L    (150-450)  k/uL


 


MPV  9.2    


 


Neutrophils %  75    %


 


Lymphocytes %  14    %


 


Monocytes %  8    %


 


Eosinophils %  0    %


 


Basophils %  0    %


 


Neutrophils #  3.0    (1.3-7.7)  k/uL


 


Lymphocytes #  0.6 L    (1.0-4.8)  k/uL


 


Monocytes #  0.3    (0-1.0)  k/uL


 


Eosinophils #  0.0    (0-0.7)  k/uL


 


Basophils #  0.0    (0-0.2)  k/uL


 


Manual Slide Review  Performed    


 


Macrocytosis  Marked A    


 


PT   10.5   (9.0-12.0)  sec


 


INR   1.0   (<1.2)  


 


APTT   20.4 L   (22.0-30.0)  sec


 


Sodium    133 L  (137-145)  mmol/L


 


Potassium    4.3  (3.5-5.1)  mmol/L


 


Chloride    102  ()  mmol/L


 


Carbon Dioxide    28  (22-30)  mmol/L


 


Anion Gap    3  mmol/L


 


BUN    21 H  (7-17)  mg/dL


 


Creatinine    1.38 H  (0.52-1.04)  mg/dL


 


Est GFR (CKD-EPI)AfAm    40  (>60 ml/min/1.73 sqM)  


 


Est GFR (CKD-EPI)NonAf    34  (>60 ml/min/1.73 sqM)  


 


Glucose    97  (74-99)  mg/dL


 


Plasma Lactic Acid Carlos     (0.7-2.0)  mmol/L


 


Calcium    9.7  (8.4-10.2)  mg/dL


 


Magnesium    1.7  (1.6-2.3)  mg/dL


 


Total Bilirubin    0.8  (0.2-1.3)  mg/dL


 


AST    27  (14-36)  U/L


 


ALT    11  (4-34)  U/L


 


Alkaline Phosphatase    65  ()  U/L


 


Lactate Dehydrogenase    455  (313-618)  U/L


 


C-Reactive Protein    15.8 H  (<1.0)  mg/dL


 


Total Protein    6.4  (6.3-8.2)  g/dL


 


Albumin    3.5  (3.5-5.0)  g/dL


 


Urine Color     


 


Urine Appearance     (Clear)  


 


Urine pH     (5.0-8.0)  


 


Ur Specific Gravity     (1.001-1.035)  


 


Urine Protein     (Negative)  


 


Urine Glucose (UA)     (Negative)  


 


Urine Ketones     (Negative)  


 


Urine Blood     (Negative)  


 


Urine Nitrite     (Negative)  


 


Urine Bilirubin     (Negative)  


 


Urine Urobilinogen     (<2.0)  mg/dL


 


Ur Leukocyte Esterase     (Negative)  


 


Urine RBC     (0-5)  /hpf


 


Urine WBC     (0-5)  /hpf


 


Urine WBC Clumps     (None)  /hpf


 


Ur Squamous Epith Cells     (0-4)  /hpf


 


Amorphous Sediment     (None)  /hpf


 


Urine Bacteria     (None)  /hpf


 


Hyaline Casts     (0-2)  /lpf


 


Coronavirus (PCR)     (Not Detectd)  














  21 Range/Units





  18:32 18:32 21:33 


 


WBC     (3.8-10.6)  k/uL


 


RBC     (3.80-5.40)  m/uL


 


Hgb     (11.4-16.0)  gm/dL


 


Hct     (34.0-46.0)  %


 


MCV     (80.0-100.0)  fL


 


MCH     (25.0-35.0)  pg


 


MCHC     (31.0-37.0)  g/dL


 


RDW     (11.5-15.5)  %


 


Plt Count     (150-450)  k/uL


 


MPV     


 


Neutrophils %     %


 


Lymphocytes %     %


 


Monocytes %     %


 


Eosinophils %     %


 


Basophils %     %


 


Neutrophils #     (1.3-7.7)  k/uL


 


Lymphocytes #     (1.0-4.8)  k/uL


 


Monocytes #     (0-1.0)  k/uL


 


Eosinophils #     (0-0.7)  k/uL


 


Basophils #     (0-0.2)  k/uL


 


Manual Slide Review     


 


Macrocytosis     


 


PT     (9.0-12.0)  sec


 


INR     (<1.2)  


 


APTT     (22.0-30.0)  sec


 


Sodium     (137-145)  mmol/L


 


Potassium     (3.5-5.1)  mmol/L


 


Chloride     ()  mmol/L


 


Carbon Dioxide     (22-30)  mmol/L


 


Anion Gap     mmol/L


 


BUN     (7-17)  mg/dL


 


Creatinine     (0.52-1.04)  mg/dL


 


Est GFR (CKD-EPI)AfAm     (>60 ml/min/1.73 sqM)  


 


Est GFR (CKD-EPI)NonAf     (>60 ml/min/1.73 sqM)  


 


Glucose     (74-99)  mg/dL


 


Plasma Lactic Acid Carlos  0.8    (0.7-2.0)  mmol/L


 


Calcium     (8.4-10.2)  mg/dL


 


Magnesium     (1.6-2.3)  mg/dL


 


Total Bilirubin     (0.2-1.3)  mg/dL


 


AST     (14-36)  U/L


 


ALT     (4-34)  U/L


 


Alkaline Phosphatase     ()  U/L


 


Lactate Dehydrogenase     (313-618)  U/L


 


C-Reactive Protein     (<1.0)  mg/dL


 


Total Protein     (6.3-8.2)  g/dL


 


Albumin     (3.5-5.0)  g/dL


 


Urine Color    Yellow  


 


Urine Appearance    Cloudy H  (Clear)  


 


Urine pH    6.0  (5.0-8.0)  


 


Ur Specific Gravity    1.018  (1.001-1.035)  


 


Urine Protein    1+ H  (Negative)  


 


Urine Glucose (UA)    Negative  (Negative)  


 


Urine Ketones    Trace H  (Negative)  


 


Urine Blood    Small H  (Negative)  


 


Urine Nitrite    Positive H  (Negative)  


 


Urine Bilirubin    Negative  (Negative)  


 


Urine Urobilinogen    <2.0  (<2.0)  mg/dL


 


Ur Leukocyte Esterase    Moderate H  (Negative)  


 


Urine RBC    5  (0-5)  /hpf


 


Urine WBC    74 H  (0-5)  /hpf


 


Urine WBC Clumps    Few H  (None)  /hpf


 


Ur Squamous Epith Cells    7 H  (0-4)  /hpf


 


Amorphous Sediment    Few H  (None)  /hpf


 


Urine Bacteria    Many H  (None)  /hpf


 


Hyaline Casts    3 H  (0-2)  /lpf


 


Coronavirus (PCR)   Not Detected   (Not Detectd)  














- Radiology Data


Radiology results: image reviewed (Chest x-ray shows large hiatal hernia.  No 

acute process.)





Disposition


Clinical Impression: 


 Urinary tract infection





Disposition: ADMITTED AS IP TO THIS HOSP


Is patient prescribed a controlled substance at d/c from ED?: No


Referrals: 


Terence Sheldon MD [Primary Care Provider] - 1-2 days


Decision Time: 22:56

## 2021-04-19 NOTE — XR
EXAMINATION TYPE: XR chest 1V portable

 

DATE OF EXAM: 4/19/2021

 

CLINICAL HISTORY: Suspected COVID-19 pneumonia.

 

TECHNIQUE:  Portable frontal view of the chest.

 

COMPARISON: 3/28/2019 chest radiograph

 

FINDINGS:  Large hiatal hernia. The cardiomediastinal silhouette is within normal limits for size. Pu
lmonary vasculature is normal. Chronic interstitial coarsening and emphysematous changes. There is no
 focal air space opacity, pleural effusion, or pneumothorax seen. The osseous structures are intact.

 

IMPRESSION:  

1. No acute cardiopulmonary process.

2. Large hiatal hernia.

## 2021-04-20 LAB
ANION GAP SERPL CALC-SCNC: 6 MMOL/L
BUN SERPL-SCNC: 23 MG/DL (ref 7–17)
CALCIUM SPEC-MCNC: 9.5 MG/DL (ref 8.4–10.2)
CHLORIDE SERPL-SCNC: 104 MMOL/L (ref 98–107)
CO2 SERPL-SCNC: 24 MMOL/L (ref 22–30)
ERYTHROCYTE [DISTWIDTH] IN BLOOD BY AUTOMATED COUNT: 2.74 M/UL (ref 3.8–5.4)
ERYTHROCYTE [DISTWIDTH] IN BLOOD: 15 % (ref 11.5–15.5)
FERRITIN SERPL-MCNC: 112.1 NG/ML (ref 10–291)
GLUCOSE SERPL-MCNC: 79 MG/DL (ref 74–99)
HCT VFR BLD AUTO: 31 % (ref 34–46)
HGB BLD-MCNC: 10 GM/DL (ref 11.4–16)
MCH RBC QN AUTO: 36.4 PG (ref 25–35)
MCHC RBC AUTO-ENTMCNC: 32.2 G/DL (ref 31–37)
MCV RBC AUTO: 113.1 FL (ref 80–100)
PLATELET # BLD AUTO: 30 K/UL (ref 150–450)
POTASSIUM SERPL-SCNC: 4.3 MMOL/L (ref 3.5–5.1)
SODIUM SERPL-SCNC: 134 MMOL/L (ref 137–145)
WBC # BLD AUTO: 3.8 K/UL (ref 3.8–10.6)

## 2021-04-20 RX ADMIN — CEFAZOLIN SCH MLS/HR: 330 INJECTION, POWDER, FOR SOLUTION INTRAMUSCULAR; INTRAVENOUS at 21:37

## 2021-04-20 RX ADMIN — DORZOLAMIDE HYDROCHLORIDE AND TIMOLOL MALEATE SCH DROPS: 20; 5 SOLUTION/ DROPS OPHTHALMIC at 21:37

## 2021-04-20 RX ADMIN — FAMOTIDINE SCH MG: 20 TABLET, FILM COATED ORAL at 13:02

## 2021-04-20 RX ADMIN — CEFAZOLIN SCH MLS/HR: 330 INJECTION, POWDER, FOR SOLUTION INTRAMUSCULAR; INTRAVENOUS at 09:14

## 2021-04-20 RX ADMIN — CEFAZOLIN SCH: 330 INJECTION, POWDER, FOR SOLUTION INTRAMUSCULAR; INTRAVENOUS at 21:32

## 2021-04-20 RX ADMIN — CEFAZOLIN SCH: 330 INJECTION, POWDER, FOR SOLUTION INTRAMUSCULAR; INTRAVENOUS at 21:31

## 2021-04-20 RX ADMIN — LATANOPROST SCH DROPS: 50 SOLUTION OPHTHALMIC at 21:37

## 2021-04-20 RX ADMIN — DORZOLAMIDE HYDROCHLORIDE AND TIMOLOL MALEATE SCH: 20; 5 SOLUTION/ DROPS OPHTHALMIC at 17:40

## 2021-04-21 LAB
ANION GAP SERPL CALC-SCNC: 5 MMOL/L
BASOPHILS # BLD AUTO: 0 K/UL (ref 0–0.2)
BASOPHILS NFR BLD AUTO: 0 %
BUN SERPL-SCNC: 17 MG/DL (ref 7–17)
CALCIUM SPEC-MCNC: 9.6 MG/DL (ref 8.4–10.2)
CELLS COUNTED: 100
CHLORIDE SERPL-SCNC: 105 MMOL/L (ref 98–107)
CO2 SERPL-SCNC: 26 MMOL/L (ref 22–30)
EOSINOPHIL # BLD AUTO: 0 K/UL (ref 0–0.7)
EOSINOPHIL NFR BLD AUTO: 1 %
ERYTHROCYTE [DISTWIDTH] IN BLOOD BY AUTOMATED COUNT: 2.74 M/UL (ref 3.8–5.4)
ERYTHROCYTE [DISTWIDTH] IN BLOOD: 15.1 % (ref 11.5–15.5)
GLUCOSE SERPL-MCNC: 100 MG/DL (ref 74–99)
HCT VFR BLD AUTO: 31.5 % (ref 34–46)
HGB BLD-MCNC: 10 GM/DL (ref 11.4–16)
LYMPHOCYTES # BLD MANUAL: 0.78 K/UL (ref 1–4.8)
LYMPHOCYTES # SPEC AUTO: 0.8 K/UL (ref 1–4.8)
LYMPHOCYTES NFR SPEC AUTO: 30 %
MCH RBC QN AUTO: 36.6 PG (ref 25–35)
MCHC RBC AUTO-ENTMCNC: 31.9 G/DL (ref 31–37)
MCV RBC AUTO: 115 FL (ref 80–100)
MONOCYTES # BLD AUTO: 0.2 K/UL (ref 0–1)
MONOCYTES # BLD MANUAL: 0.39 K/UL (ref 0–1)
MONOCYTES NFR BLD AUTO: 8 %
NEUTROPHILS # BLD AUTO: 1.4 K/UL (ref 1.3–7.7)
NEUTROPHILS NFR BLD AUTO: 56 %
NEUTROPHILS NFR BLD MANUAL: 55 %
NEUTS SEG # BLD MANUAL: 1.43 K/UL (ref 1.3–7.7)
PLATELET # BLD AUTO: 37 K/UL (ref 150–450)
POTASSIUM SERPL-SCNC: 4.1 MMOL/L (ref 3.5–5.1)
SODIUM SERPL-SCNC: 136 MMOL/L (ref 137–145)
WBC # BLD AUTO: 2.6 K/UL (ref 3.8–10.6)

## 2021-04-21 RX ADMIN — CEFAZOLIN SCH MLS/HR: 330 INJECTION, POWDER, FOR SOLUTION INTRAMUSCULAR; INTRAVENOUS at 19:52

## 2021-04-21 RX ADMIN — CEFAZOLIN SCH MLS/HR: 330 INJECTION, POWDER, FOR SOLUTION INTRAMUSCULAR; INTRAVENOUS at 11:28

## 2021-04-21 RX ADMIN — FAMOTIDINE SCH MG: 20 TABLET, FILM COATED ORAL at 07:47

## 2021-04-21 RX ADMIN — AMMONIUM LACTATE SCH APPLIC: 12 LOTION TOPICAL at 11:29

## 2021-04-21 RX ADMIN — DORZOLAMIDE HYDROCHLORIDE AND TIMOLOL MALEATE SCH DROPS: 20; 5 SOLUTION/ DROPS OPHTHALMIC at 07:47

## 2021-04-21 RX ADMIN — LATANOPROST SCH DROPS: 50 SOLUTION OPHTHALMIC at 19:52

## 2021-04-21 RX ADMIN — DORZOLAMIDE HYDROCHLORIDE AND TIMOLOL MALEATE SCH DROPS: 20; 5 SOLUTION/ DROPS OPHTHALMIC at 19:52

## 2021-04-21 RX ADMIN — AMMONIUM LACTATE SCH APPLIC: 12 LOTION TOPICAL at 19:52

## 2021-04-21 RX ADMIN — CEFAZOLIN SCH: 330 INJECTION, POWDER, FOR SOLUTION INTRAMUSCULAR; INTRAVENOUS at 19:53

## 2021-04-21 NOTE — P.PN
Subjective


Progress Note Date: 04/20/21





HISTORY OF PRESENT ILLNESS:


This is an 87-year-old  female patient with past history of 

hypertension, glaucoma, chronic kidney disease stage IIIB, chronic gout, chronic

anemia with history of acute GI bleed secondary to acute ischemic colitis and 

gastritis.  She presented to [facility] emergency center due to

fever that have been going on for 2-3 days.  She also had cough and mild 

shortness of breath.  She denied dysuria, no abdominal pain.  There was 

generalized weakness and muscle aches.  Temperature was 101.7, heart rate 75, 

blood pressure 144/65, pulse ox 90% on room air.  EKG sinus rhythm with no acute

ST changes.  WBC 4, hemoglobin 10.5, platelet count 42.  Sodium 133, potassium 

4.3, chloride 102, CO2 28, BUN 21 and creatinine 1.38, blood sugar 97.  Liver 

function tests were normal.  Magnesium 1.7.  C-reactive protein 15.8.  . 

Lactic acid 0.8.  Urinalysis cloudy with nitrate positive, leukoesterase 

moderate, WBC 74, squamous cells 7, few WBC clumps.  Many bacteria.  Coronavirus

PCR not detected.  Chest x-ray reveals no acute cardiopulmonary process.  Large 

hiatal hernia.  Patient was placed in the observation unit and started on 

Rocephin, urine and blood culture obtained.





4/20: Patient is seen today in the emergency center, waiting for [facility] bed.  

She has been afebrile, heart rate 65, blood pressure 160/78, pulse ox 100% on 

room air.  Repeat blood work revealed WBC 3.8, hemoglobin 10, platelet count 30.

 Sodium 134, potassium 4.3, chloride 104, CO2 24, BUN 23 and creatinine 1.21.  

Blood sugar 79.  Blood culture is in progress.  Urine culture in progress.  

Patient is followed by PT and OT as well as  for discharge planning.





REVIEW OF SYSTEMS:


Constitutional: No documented fever, no chills, no night sweats.  No weight 

change.  No weakness, fatigue or lethargy.  No daytime sleepiness.


EENT: No headache.  No blurred vision or double vision, no loss of vision.  No 

loss of Hearing, no ringing in the ears, no dizziness.  No nasal drainage or 

congestion.  No epistaxis.  No sore throat.


Lungs: No shortness of breath, no cough, no sputum production.  No wheezing.  

Reports dyspnea with activity.


Cardiovascular: No chest pain, no lower extremity edema.  No palpitations.  No 

paroxysmal nocturnal dyspnea.  No orthopnea.  No lightheadedness or dizziness.  

No syncopal episodes.


Abdominal: Reports abdominal pain.  No nausea, vomiting.  No diarrhea.  No 

constipation.  No bloody or tarry stools reports loss of appetite.


Genitourinary: No dysuria, increased frequency, urgency.  No urinary retention.


Musculoskeletal: No myalgias.  No muscle weakness, no gait dysfunction, no 

frequent falls.  No back pain.  No neck pain.


Integumentary: No wounds, no lesions.  No rash or pruritus.  No unusual 

bruising.  No change in hair or nails.


Neurologic: No aphasia. No facial droop. No change in mentation. No head injury.

No headache. No paralysis. No paresthesia.


Psychiatric: No depression.  No anxiety.  No mood swings.


Endocrine: No abnormal blood sugars.  No weight change.  





PHYSICAL EXAMINATION:


General: This is an 87-year-old  female.  She is resting on the ER 

stretcher and appears to be uncomfortable.  No acute respiratory distress.  


HEENT: Head is atraumatic, normocephalic, pupils were equal round reactive to 

light and recommendation, extraocular muscle movement were intact, sclera 

nonicteric, conjunctivae were pale, mucous membranes of the mouth are somewhat 

dry.


Neck: Supple, no JVP, normal carotid upstroke bilaterally, no lymphadenopathy.


Chest: Decreased breath sounds at the bases, few rhonchi, no extremity wheezes, 

no chest wall tenderness, no intercostal retractions.


Heart: First heart sound is normal, second heart sounds normal


Abdomen: Soft, nontender, nondistended, positive bowel sounds.


Extremities: There is no edema no calf tenderness DP +2 bilaterally.


Neurologic examination: Patient is awake alert and oriented lert and oriented 

x3, cranial nerves II-12 appear grossly intact, muscle power were 5 out of 5 in 

upper extremities and 5 out of 5 in bilateral lower extremities, deep tendon 

reflexes normal bilaterally.





ASSESSMENT AND PLAN:


1.  Febrile illness secondary to acute urinary tract infection.  Patient started

on Rocephin 1 g IV piggyback daily, urine culture, blood culture, IV fluid 0.9 

normal saline at 75 mL per hour.  Patient to be admitted to [facility] floor.





2.  Hypertension, stable.





3.  Glaucoma.  Continue current eyedrops.





4.  History of GI bleed secondary to ask acute ischemic colitis and gastritis.





5.  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  Hemoglobin is stable.





6.  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





7.  Chronic gout.





8.  Chronic kidney disease stage IIIB.  Avoid hypotension, nephrotoxic agents.





9.  GI prophylaxis.  Continue Pepcid 20 mg daily.





10.  DVT prophylaxis.  SCDs and MARLO hose.  No heparin due to thrombocytopenia.





DISCHARGE PLAN:


[facility] for subacute rehab





Objective





- Vital Signs


Vital signs: 


                                   Vital Signs











Temp  97.1 F L  04/21/21 07:00


 


Pulse  58 L  04/21/21 07:00


 


Resp  16   04/21/21 14:00


 


BP  131/73   04/21/21 07:00


 


Pulse Ox  97   04/21/21 07:00








                                 Intake & Output











 04/20/21 04/21/21 04/21/21





 18:59 06:59 18:59


 


Intake Total 100 560 525


 


Balance 100 560 525


 


Intake:   


 


  IV   525


 


    Sodium Chloride 0.9% 1,   525





    000 ml @ 75 mls/hr IV .   





    [id number] RAMONA Rx#:[id number]   


 


  Intake, IV Titration  560 





  Amount   


 


    Sodium Chloride 0.9% 1,  560 





    000 ml @ 75 mls/hr IV .   





    [id number] RAMONA Rx#:[id number]   


 


  Oral 100  


 


Other:   


 


  Voiding Method Bedpan Bedpan Bedpan





   Diaper





   Incontinent


 


  # Voids  2 2














- Labs


CBC & Chem 7: 


                                 04/21/21 08:24





                                 04/21/21 08:24


Labs: 


                  Abnormal Lab Results - Last 24 Hours (Table)











  04/21/21 04/21/21 Range/Units





  08:24 08:24 


 


WBC  2.6 L   (3.8-10.6)  k/uL


 


RBC  2.74 L   (3.80-5.40)  m/uL


 


Hgb  10.0 L   (11.4-16.0)  gm/dL


 


Hct  31.5 L   (34.0-46.0)  %


 


MCV  115.0 H   (80.0-100.0)  fL


 


MCH  36.6 H   (25.0-35.0)  pg


 


Plt Count  37 L   (150-450)  k/uL


 


Lymphocytes #  0.8 L   (1.0-4.8)  k/uL


 


Lymphocytes # (Manual)  0.78 L   (1.0-4.8)  k/uL


 


Macrocytosis  Marked A   


 


Sodium   136 L  (137-145)  mmol/L


 


Creatinine   1.05 H  (0.52-1.04)  mg/dL


 


Glucose   100 H  (74-99)  mg/dL








                      Microbiology - Last 24 Hours (Table)











 04/19/21 21:33 Urine Culture - Preliminary





 Urine,Voided    Gram Neg Bacilli


 


 04/19/21 18:30 Blood Culture - Preliminary





 Blood    No Growth after 24 hours


 


 04/19/21 18:15 Blood Culture - Preliminary





 Blood    No Growth after 24 hours

## 2021-04-21 NOTE — P.PN
Subjective


Progress Note Date: 04/21/21





HISTORY OF PRESENT ILLNESS:


This is an 87-year-old  female patient with past history of 

hypertension, glaucoma, chronic kidney disease stage IIIB, chronic gout, chronic

anemia with history of acute GI bleed secondary to acute ischemic colitis and 

gastritis.  She presented to [facility] emergency center due to

fever that have been going on for 2-3 days.  She also had cough and mild 

shortness of breath.  She denied dysuria, no abdominal pain.  There was 

generalized weakness and muscle aches.  Temperature was 101.7, heart rate 75, 

blood pressure 144/65, pulse ox 90% on room air.  EKG sinus rhythm with no acute

ST changes.  WBC 4, hemoglobin 10.5, platelet count 42.  Sodium 133, potassium 

4.3, chloride 102, CO2 28, BUN 21 and creatinine 1.38, blood sugar 97.  Liver 

function tests were normal.  Magnesium 1.7.  C-reactive protein 15.8.  . 

Lactic acid 0.8.  Urinalysis cloudy with nitrate positive, leukoesterase 

moderate, WBC 74, squamous cells 7, few WBC clumps.  Many bacteria.  Coronavirus

PCR not detected.  Chest x-ray reveals no acute cardiopulmonary process.  Large 

hiatal hernia.  Patient was placed in the observation unit and started on 

Rocephin, urine and blood culture obtained.





4/20: Patient is seen today in the emergency center, waiting for [facility] bed.  

She has been afebrile, heart rate 65, blood pressure 160/78, pulse ox 100% on 

room air.  Repeat blood work revealed WBC 3.8, hemoglobin 10, platelet count 30.

 Sodium 134, potassium 4.3, chloride 104, CO2 24, BUN 23 and creatinine 1.21.  

Blood sugar 79.  Blood culture is in progress.  Urine culture in progress.  

Patient is followed by PT and OT as well as  for discharge planning.





4/22: Blood cultures no growth after 24 hours 2 specimens.  Urine culture is 

gram-negative bacilli and sensitivity not available.  Patient continued on 

Rocephin.  She is denying any new complaints.  No abdominal pain.  No blood in 

her stools.  She has been afebrile, heart rate 62, blood pressure 129/64, pulse 

ox 97% on 2 L nasal cannula.  Anticipate probable discharge to [facility] tomorrow.





REVIEW OF SYSTEMS:


Constitutional: No documented fever, no chills, no night sweats.  No weight 

change.  No weakness, fatigue or lethargy.  No daytime sleepiness.


EENT: No headache.  No blurred vision or double vision, no loss of vision.  No 

loss of Hearing, no ringing in the ears, no dizziness.  No nasal drainage or 

congestion.  No epistaxis.  No sore throat.


Lungs: No shortness of breath, no cough, no sputum production.  No wheezing.  

Reports dyspnea with activity.


Cardiovascular: No chest pain, no lower extremity edema.  No palpitations.  No 

paroxysmal nocturnal dyspnea.  No orthopnea.  No lightheadedness or dizziness.  

No syncopal episodes.


Abdominal: Denies abdominal pain.  No nausea, vomiting.  No diarrhea.  No 

constipation.  No bloody or tarry stools reports loss of appetite.


Genitourinary: No dysuria, increased frequency, urgency.  No urinary retention.


Musculoskeletal: No myalgias.  No muscle weakness, no gait dysfunction, no 

frequent falls.  No back pain.  No neck pain.


Integumentary: No wounds, no lesions.  No rash or pruritus.  No unusual 

bruising.  No change in hair or nails.


Neurologic: No aphasia. No facial droop. No change in mentation. No head injury.

No headache. No paralysis. No paresthesia.


Psychiatric: No depression.  No anxiety.  No mood swings.


Endocrine: No abnormal blood sugars.  No weight change.  





PHYSICAL EXAMINATION:


General: This is an 87-year-old  female.  She is resting on the ER 

stretcher and appears to be uncomfortable.  No acute respiratory distress.  


HEENT: Head is atraumatic, normocephalic, pupils were equal round reactive to 

light and recommendation, extraocular muscle movement were intact, sclera 

nonicteric, conjunctivae were pale, mucous membranes of the mouth are somewhat 

dry.


Neck: Supple, no JVP, normal carotid upstroke bilaterally, no lymphadenopathy.


Chest: Decreased breath sounds at the bases, few rhonchi, no extremity wheezes, 

no chest wall tenderness, no intercostal retractions.


Heart: First heart sound is normal, second heart sounds normal


Abdomen: Soft, nontender, nondistended, positive bowel sounds.


Extremities: There is no edema no calf tenderness DP +2 bilaterally.


Neurologic examination: Patient is awake alert and oriented lert and oriented 

x3, cranial nerves II-12 appear grossly intact, muscle power were 5 out of 5 in 

upper extremities and 5 out of 5 in bilateral lower extremities, deep tendon 

reflexes normal bilaterally.





ASSESSMENT AND PLAN:


1.  Febrile illness secondary to acute urinary tract infection.  Patient started

on Rocephin 1 g IV piggyback daily, urine culture, blood culture, IV fluid 0.9 

normal saline at 75 mL per hour.  Await finalized urine culture.





2.  Hypertension, stable.





3.  Glaucoma.  Continue current eyedrops.





4.  History of GI bleed secondary to ask acute ischemic colitis and gastritis.





5.  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  Hemoglobin is stable.





6.  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





7.  Chronic gout.





8.  Chronic kidney disease stage IIIB.  Avoid hypotension, nephrotoxic agents.





9.  GI prophylaxis.  Continue Pepcid 20 mg daily.





10.  DVT prophylaxis.  SCDs and MARLO hose.  No heparin due to thrombocytopenia.





DISCHARGE PLAN:


[facility] for subacute rehab on Thursday





Objective





- Vital Signs


Vital signs: 


                                   Vital Signs











Temp  97.9 F   04/21/21 15:00


 


Pulse  54 L  04/21/21 15:00


 


Resp  18   04/21/21 15:00


 


BP  150/75   04/21/21 15:00


 


Pulse Ox  99   04/21/21 15:00








                                 Intake & Output











 04/20/21 04/21/21 04/21/21





 18:59 06:59 18:59


 


Intake Total 100 560 525


 


Balance 100 560 525


 


Intake:   


 


  IV   525


 


    Sodium Chloride 0.9% 1,   525





    000 ml @ 75 mls/hr IV .   





    [id number] RAMONA Rx#:[id number]   


 


  Intake, IV Titration  560 





  Amount   


 


    Sodium Chloride 0.9% 1,  560 





    000 ml @ 75 mls/hr IV .   





    [id number] RAMONA Rx#:[id number]   


 


  Oral 100  


 


Other:   


 


  Voiding Method Bedpan Bedpan Bedpan





   Diaper





   Incontinent


 


  # Voids  2 2














- Labs


CBC & Chem 7: 


                                 04/21/21 08:24





                                 04/21/21 08:24


Labs: 


                  Abnormal Lab Results - Last 24 Hours (Table)











  04/21/21 04/21/21 Range/Units





  08:24 08:24 


 


WBC  2.6 L   (3.8-10.6)  k/uL


 


RBC  2.74 L   (3.80-5.40)  m/uL


 


Hgb  10.0 L   (11.4-16.0)  gm/dL


 


Hct  31.5 L   (34.0-46.0)  %


 


MCV  115.0 H   (80.0-100.0)  fL


 


MCH  36.6 H   (25.0-35.0)  pg


 


Plt Count  37 L   (150-450)  k/uL


 


Lymphocytes #  0.8 L   (1.0-4.8)  k/uL


 


Lymphocytes # (Manual)  0.78 L   (1.0-4.8)  k/uL


 


Macrocytosis  Marked A   


 


Sodium   136 L  (137-145)  mmol/L


 


Creatinine   1.05 H  (0.52-1.04)  mg/dL


 


Glucose   100 H  (74-99)  mg/dL








                      Microbiology - Last 24 Hours (Table)











 04/19/21 21:33 Urine Culture - Preliminary





 Urine,Voided    Gram Neg Bacilli


 


 04/19/21 18:30 Blood Culture - Preliminary





 Blood    No Growth after 24 hours


 


 04/19/21 18:15 Blood Culture - Preliminary





 Blood    No Growth after 24 hours

## 2021-04-21 NOTE — P.HPIM
History of Present Illness


H&P Date: 21


Chief Complaint: fever





HISTORY OF PRESENT ILLNESS:


This is an 87-year-old  female patient with past history of 

hypertension, glaucoma, chronic kidney disease stage IIIB, chronic gout, chronic

anemia with history of acute GI bleed secondary to acute ischemic colitis and 

gastritis.  She presented to [facility] emergency center due to

fever that have been going on for 2-3 days.  She also had cough and mild 

shortness of breath.  She denied dysuria, no abdominal pain.  There was 

generalized weakness and muscle aches.  Temperature was 101.7, heart rate 75, 

blood pressure 144/65, pulse ox 90% on room air.  EKG sinus rhythm with no acute

ST changes.  WBC 4, hemoglobin 10.5, platelet count 42.  Sodium 133, potassium 

4.3, chloride 102, CO2 28, BUN 21 and creatinine 1.38, blood sugar 97.  Liver 

function tests were normal.  Magnesium 1.7.  C-reactive protein 15.8.  . 

Lactic acid 0.8.  Urinalysis cloudy with nitrate positive, leukoesterase 

moderate, WBC 74, squamous cells 7, few WBC clumps.  Many bacteria.  Coronavirus

PCR not detected.  Chest x-ray reveals no acute cardiopulmonary process.  Large 

hiatal hernia.  Patient was placed in the observation unit and started on 

Rocephin, urine and blood culture obtained.





REVIEW OF SYSTEMS:


Constitutional: No documented fever, no chills, no night sweats.  No weight 

change.  No weakness, fatigue or lethargy.  No daytime sleepiness.


EENT: No headache.  No blurred vision or double vision, no loss of vision.  No 

loss of Hearing, no ringing in the ears, no dizziness.  No nasal drainage or 

congestion.  No epistaxis.  No sore throat.


Lungs: No shortness of breath, no cough, no sputum production.  No wheezing.  

Reports dyspnea with activity.


Cardiovascular: No chest pain, no lower extremity edema.  No palpitations.  No 

paroxysmal nocturnal dyspnea.  No orthopnea.  No lightheadedness or dizziness.  

No syncopal episodes.


Abdominal: Reports abdominal pain.  No nausea, vomiting.  No diarrhea.  No 

constipation.  No bloody or tarry stools reports loss of appetite.


Genitourinary: No dysuria, increased frequency, urgency.  No urinary retention.


Musculoskeletal: No myalgias.  No muscle weakness, no gait dysfunction, no 

frequent falls.  No back pain.  No neck pain.


Integumentary: No wounds, no lesions.  No rash or pruritus.  No unusual bru

ising.  No change in hair or nails.


Neurologic: No aphasia. No facial droop. No change in mentation. No head injury.

No headache. No paralysis. No paresthesia.


Psychiatric: No depression.  No anxiety.  No mood swings.


Endocrine: No abnormal blood sugars.  No weight change.  





PAST MEDICAL HISTORY:


Hypertension, glaucoma, chronic gout, anemia, myelodysplasia, chronic kidney 

disease stage III A, peptic ulcer disease, glaucoma, osteoarthritis, scoliosis.





PAST SURGICAL HISTORY:


Partial right hip replacement, bilateral cataract removal and intraocular lens 

implants, peptic ulcer disease surgery, bone marrow biopsy,





SOCIAL HISTORY:


Patient was a smoker one pack per day for 40 years and quit 13 years ago.  She 

drinks alcohol rarely.  She denies any marijuana or illicit drug use.  She does 

not require CPAP, oxygen, nebulizer at home.





FAMILY HISTORY: 


Father  at the age of 69 from CAD,.  Mother  in her 90s from old age and

congestive heart failure,  Patient has one sister with thypertension patient 

does not have any others.  No children.





PHYSICAL EXAMINATION:


General: This is an 87-year-old  female.  She is resting on the ER 

stretcher and appears to be uncomfortable.  No acute respiratory distress.  


HEENT: Head is atraumatic, normocephalic, pupils were equal round reactive to 

light and recommendation, extraocular muscle movement were intact, sclera 

nonicteric, conjunctivae were pale, mucous membranes of the mouth are somewhat 

dry.


Neck: Supple, no JVP, normal carotid upstroke bilaterally, no lymphadenopathy.


Chest: Decreased breath sounds at the bases, few rhonchi, no extremity wheezes, 

no chest wall tenderness, no intercostal retractions.


Heart: First heart sound is normal, second heart sounds normal


Abdomen: Soft, nontender, nondistended, positive bowel sounds.


Extremities: There is no edema no calf tenderness DP +2 bilaterally.


Neurologic examination: Patient is awake alert and oriented lert and oriented 

x3, cranial nerves II-12 appear grossly intact, muscle power were 5 out of 5 in 

upper extremities and 5 out of 5 in bilateral lower extremities, deep tendon 

reflexes normal bilaterally.





ASSESSMENT AND PLAN:


1.  UTI with SIRS.  Patient started on Rocephin 1 g IV piggyback daily, urine 

culture, blood culture, IV fluid 0.9 normal saline at 75 mL per hour.  Patient 

to be admitted to Medr floor.





2.  Hypertension  and hypertensive cardio vascular disease.  Continue carvedilol

12.5 mg orally twice every day.





3.  Glaucoma.  Continue current eyedrops.





4.  History of GI bleed secondary to acute ischemic colitis and gastritis.





5.  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  Hemoglobin is stable.





6.  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





7.  Chronic gout. continue allopurinol 100 mg orally once every day.  





8.  Chronic kidney disease stage IIIB.  Avoid hypotension, nephrotoxic agents.





9.  GI prophylaxis.  Continue Pepcid 20 mg daily.





10.  DVT prophylaxis.  SCDs and MARLO hose.  No heparin due to thrombocytopenia.





11.  Admitted to inpatient.  Estimate a length of stay 2 midnights.





12.  Patient is full code.





DISCHARGE PLAN:


[facility] for subacute rehab





Past Medical History


Past Medical History: Hypertension


Additional Past Medical History / Comment(s): glaucoma, gout, anemia, 

thrombocytopenia, diverticulosis, ischemic colitis


History of Any Multi-Drug Resistant Organisms: None Reported


Past Surgical History: Joint Replacement, Orthopedic Surgery


Additional Past Surgical History / Comment(s): Partial right hip replacement, 

bilateral cataract removal and intraocular lens implants


Past Psychological History: No Psychological Hx Reported


Smoking Status: Former smoker


Past Alcohol Use History: None Reported


Past Drug Use History: None Reported





- Past Family History


  ** Father


Additional Family Medical History / Comment(s): Father  in his 60s





  ** Mother


Additional Family Medical History / Comment(s): Mother  in her 90s from old 

age.  Patient has 1 sister with no major medical problems.  Patient does not 

have any brothers or children.





Medications and Allergies


                                Home Medications











 Medication  Instructions  Recorded  Confirmed  Type


 


Carvedilol [Coreg] 12.5 mg PO BID 18 History


 


allopurinoL [Zyloprim] 100 mg PO DAILY 18 History


 


Latanoprost Ophth [Xalatan 0.005%] 1 drop BOTH EYES HS 18 History


 


traMADol HCL [Ultram] 50 mg PO TID PRN 19 History


 


Dorzolamide-Timol 2.23%/0.68% 1 drop RIGHT EYE BID 21 History





[Cosopt]    


 


Famotidine [Pepcid] 20 mg PO DAILY 21 History


 


Metoclopramide [Reglan] 5 mg PO BID-W/MEALS PRN 21 History


 


Metoclopramide [Reglan] 5 mg PO HS PRN 21 History








                                    Allergies











Allergy/AdvReac Type Severity Reaction Status Date / Time


 


adhesive tape Allergy  Unknown Verified 21 19:23


 


dexamethasone Allergy  Unknown Verified 21 19:23


 


neomycin Allergy  Unknown Verified 21 19:23


 


polymyxin B Allergy  Unknown Verified 21 19:23














Physical Exam


Vitals: 


                                   Vital Signs











  Temp Pulse Resp BP Pulse Ox


 


 21 08:11  98.6 F  65  18  160/78  100


 


 21 05:00   79  21  151/76  99


 


 21 04:00   74  20  151/77  99


 


 21 21:33   71  18  136/74  98


 


 21 19:46  99.8 F H  71  18  134/74  97


 


 21 18:31   72  18  144/63  99


 


 21 17:20  101.7 F H  75  18  144/65  98








                                Intake and Output











 21





 22:59 06:59 14:59


 


Other:   


 


  Weight 72.575 kg  














Results


CBC & Chem 7: 


                                 21 08:24





                                 21 08:24


Labs: 


                  Abnormal Lab Results - Last 24 Hours (Table)











  21 Range/Units





  18:32 18:32 18:32 


 


RBC  2.74 L    (3.80-5.40)  m/uL


 


Hgb  10.5 L    (11.4-16.0)  gm/dL


 


Hct  30.3 L    (34.0-46.0)  %


 


MCV  110.7 H    (80.0-100.0)  fL


 


MCH  38.3 H    (25.0-35.0)  pg


 


Plt Count  42 L    (150-450)  k/uL


 


Lymphocytes #  0.6 L    (1.0-4.8)  k/uL


 


Macrocytosis  Marked A    


 


APTT   20.4 L   (22.0-30.0)  sec


 


Sodium    133 L  (137-145)  mmol/L


 


BUN    21 H  (7-17)  mg/dL


 


Creatinine    1.38 H  (0.52-1.04)  mg/dL


 


C-Reactive Protein    15.8 H  (<1.0)  mg/dL


 


Procalcitonin     (0.02-0.09)  ng/mL


 


Urine Appearance     (Clear)  


 


Urine Protein     (Negative)  


 


Urine Ketones     (Negative)  


 


Urine Blood     (Negative)  


 


Urine Nitrite     (Negative)  


 


Ur Leukocyte Esterase     (Negative)  


 


Urine WBC     (0-5)  /hpf


 


Urine WBC Clumps     (None)  /hpf


 


Ur Squamous Epith Cells     (0-4)  /hpf


 


Amorphous Sediment     (None)  /hpf


 


Urine Bacteria     (None)  /hpf


 


Hyaline Casts     (0-2)  /lpf














  21 Range/Units





  18:32 21:33 03:20 


 


RBC     (3.80-5.40)  m/uL


 


Hgb     (11.4-16.0)  gm/dL


 


Hct     (34.0-46.0)  %


 


MCV     (80.0-100.0)  fL


 


MCH     (25.0-35.0)  pg


 


Plt Count     (150-450)  k/uL


 


Lymphocytes #     (1.0-4.8)  k/uL


 


Macrocytosis     


 


APTT     (22.0-30.0)  sec


 


Sodium    134 L  (137-145)  mmol/L


 


BUN    23 H  (7-17)  mg/dL


 


Creatinine    1.21 H  (0.52-1.04)  mg/dL


 


C-Reactive Protein     (<1.0)  mg/dL


 


Procalcitonin  7.01 H    (0.02-0.09)  ng/mL


 


Urine Appearance   Cloudy H   (Clear)  


 


Urine Protein   1+ H   (Negative)  


 


Urine Ketones   Trace H   (Negative)  


 


Urine Blood   Small H   (Negative)  


 


Urine Nitrite   Positive H   (Negative)  


 


Ur Leukocyte Esterase   Moderate H   (Negative)  


 


Urine WBC   74 H   (0-5)  /hpf


 


Urine WBC Clumps   Few H   (None)  /hpf


 


Ur Squamous Epith Cells   7 H   (0-4)  /hpf


 


Amorphous Sediment   Few H   (None)  /hpf


 


Urine Bacteria   Many H   (None)  /hpf


 


Hyaline Casts   3 H   (0-2)  /lpf














  21 Range/Units





  03:20 


 


RBC  2.74 L  (3.80-5.40)  m/uL


 


Hgb  10.0 L  (11.4-16.0)  gm/dL


 


Hct  31.0 L  (34.0-46.0)  %


 


MCV  113.1 H  (80.0-100.0)  fL


 


MCH  36.4 H  (25.0-35.0)  pg


 


Plt Count   (150-450)  k/uL


 


Lymphocytes #   (1.0-4.8)  k/uL


 


Macrocytosis  Marked A  


 


APTT   (22.0-30.0)  sec


 


Sodium   (137-145)  mmol/L


 


BUN   (7-17)  mg/dL


 


Creatinine   (0.52-1.04)  mg/dL


 


C-Reactive Protein   (<1.0)  mg/dL


 


Procalcitonin   (0.02-0.09)  ng/mL


 


Urine Appearance   (Clear)  


 


Urine Protein   (Negative)  


 


Urine Ketones   (Negative)  


 


Urine Blood   (Negative)  


 


Urine Nitrite   (Negative)  


 


Ur Leukocyte Esterase   (Negative)  


 


Urine WBC   (0-5)  /hpf


 


Urine WBC Clumps   (None)  /hpf


 


Ur Squamous Epith Cells   (0-4)  /hpf


 


Amorphous Sediment   (None)  /hpf


 


Urine Bacteria   (None)  /hpf


 


Hyaline Casts   (0-2)  /lpf








                      Microbiology - Last 24 Hours (Table)











 21 21:33 Urine Culture - Preliminary





 Urine,Voided

## 2021-04-22 RX ADMIN — LATANOPROST SCH DROPS: 50 SOLUTION OPHTHALMIC at 21:02

## 2021-04-22 RX ADMIN — AMMONIUM LACTATE SCH APPLIC: 12 LOTION TOPICAL at 08:48

## 2021-04-22 RX ADMIN — AMMONIUM LACTATE SCH APPLIC: 12 LOTION TOPICAL at 21:02

## 2021-04-22 RX ADMIN — DORZOLAMIDE HYDROCHLORIDE AND TIMOLOL MALEATE SCH DROPS: 20; 5 SOLUTION/ DROPS OPHTHALMIC at 08:48

## 2021-04-22 RX ADMIN — DORZOLAMIDE HYDROCHLORIDE AND TIMOLOL MALEATE SCH DROPS: 20; 5 SOLUTION/ DROPS OPHTHALMIC at 21:02

## 2021-04-22 RX ADMIN — CEFAZOLIN SCH MLS/HR: 330 INJECTION, POWDER, FOR SOLUTION INTRAMUSCULAR; INTRAVENOUS at 21:03

## 2021-04-22 RX ADMIN — CEFAZOLIN SCH: 330 INJECTION, POWDER, FOR SOLUTION INTRAMUSCULAR; INTRAVENOUS at 21:00

## 2021-04-22 RX ADMIN — FAMOTIDINE SCH MG: 20 TABLET, FILM COATED ORAL at 08:44

## 2021-04-22 NOTE — P.CONS
History of Present Illness





- Reason for Consult


Consult date: 21


pancytopenia


Requesting physician: Terence Sheldon





- Chief Complaint


Fever





- History of Present Illness





This is a very nice lady who was referred to Dr. Solorzano because of abnormal 

protein electrophoresis. She was last seen in the office in 2020. 


In ,serum immunofixation and SPEP revealed 3 small M-spike (0.1,0.5

and 0.1gm/dl),serum free light chain ratio was 4.77,24 hrs urine did not reveal 

any monoclonal protein and no significant proteinuria,CMP revealed creatinine of

1.7 (however,it has been elevated at least since ),her CBc revealed 

mild anemia (hemoglobin of 10.7gm/dl (which has been stable),her B12 was 

significantly low.She has a known history of hypertension, glaucoma, chronic 

kidney disease stage IIIB, chronic gout, chronic anemia with history of acute GI

bleed secondary to acute ischemic colitis and gastritis.





She stated that she had gastric surgery in  for a bleeding ulcer.





She was admitted to [facility] in 2018 for GIB,had EGD/colonoscopy,was found to 

have thrombocytopenia.


She underwent bone marrow biopsy after discharge on 2018 which revealed 

very small population of monoclonal B cells,no evidence of MDS,FISH was negative


I recommended repeating bone marrow biopsy to make sure there is no evolving 

MDS,however,she declined and opted for observation








  She presented to [facility] with complaints of a fever.  She also had 

cough and mild shortness of breath.  She is being treated for UTI at the moment.

She has chronic pancytopenia, which has decreased further likely related to her 

active inflammatory/infectious circumstance. 








Review of Systems


All systems: negative


Constitutional: Reports as per HPI





Past Medical History


Past Medical History: Hypertension


Additional Past Medical History / Comment(s): glaucoma, gout, anemia, t

hrombocytopenia, diverticulosis, ischemic colitis


History of Any Multi-Drug Resistant Organisms: None Reported


Past Surgical History: Joint Replacement, Orthopedic Surgery


Additional Past Surgical History / Comment(s): Partial right hip replacement, 

bilateral cataract removal and intraocular lens implants


Past Anesthesia/Blood Transfusion Reactions: No Reported Reaction


Past Psychological History: No Psychological Hx Reported


Smoking Status: Former smoker


Past Alcohol Use History: None Reported


Past Drug Use History: None Reported





- Past Family History


  ** Father


Additional Family Medical History / Comment(s): Father  in his 60s





  ** Mother


Additional Family Medical History / Comment(s): Mother  in her 90s from old 

age.  Patient has 1 sister with no major medical problems.  Patient does not 

have any brothers or children.





Medications and Allergies


                                Home Medications











 Medication  Instructions  Recorded  Confirmed  Type


 


Carvedilol [Coreg] 12.5 mg PO BID 18 History


 


allopurinoL [Zyloprim] 100 mg PO DAILY 18 History


 


Latanoprost Ophth [Xalatan 0.005%] 1 drop BOTH EYES HS 18 History


 


traMADol HCL [Ultram] 50 mg PO TID PRN 19 History


 


Dorzolamide-Timol 2.23%/0.68% 1 drop RIGHT EYE BID 21 History





[Cosopt]    


 


Famotidine [Pepcid] 20 mg PO DAILY 21 History


 


Metoclopramide [Reglan] 5 mg PO BID-W/MEALS PRN 21 History


 


Metoclopramide [Reglan] 5 mg PO HS PRN 21 History








                                    Allergies











Allergy/AdvReac Type Severity Reaction Status Date / Time


 


adhesive tape Allergy  Unknown Verified 21 19:23


 


dexamethasone Allergy  Unknown Verified 21 19:23


 


neomycin Allergy  Unknown Verified 21 19:23


 


polymyxin B Allergy  Unknown Verified 21 19:23














Physical Exam


Vitals: 


                                   Vital Signs











  Temp Pulse Resp BP Pulse Ox


 


 21 14:04  97.4 F L  55 L  22  167/75  100


 


 21 07:00  97.5 F L  58 L  24  143/80  98


 


 21 01:35    16  


 


 21 00:49  97.9 F  50 L  18  134/74  98








                                Intake and Output











 21





 06:59 14:59 22:59


 


Intake Total  240 


 


Balance  240 


 


Intake:   


 


  Oral  240 


 


Other:   


 


  Voiding Method Bedpan Bedpan 





 Diaper Diaper 





 Incontinent Incontinent 


 


  # Voids 3 1 


 


  # Bowel Movements  0 














- Constitutional


General appearance: cooperative, no acute distress





- EENT


Eyes: EOMI, PERRLA


ENT: hard of hearing, NA/AT





- Neck


Neck: normal ROM





- Respiratory


Respiratory: bilateral: diminished





- Cardiovascular


Rhythm: regularly irregular





- Integumentary


Integumentary: pale





- Musculoskeletal


Musculoskeletal: generalized weakness, strength equal bilaterally





- Psychiatric


Psychiatric: A&O x's 3





Results


CBC & Chem 7: 


                                 21 08:24





                                 21 08:24


Labs: 


                      Microbiology - Last 24 Hours (Table)











 21 21:33 Urine Culture - Final





 Urine,Voided    Escherichia coli


 


 21 18:30 Blood Culture - Preliminary





 Blood    No Growth after 48 hours


 


 21 18:15 Blood Culture - Preliminary





 Blood    No Growth after 48 hours














Assessment and Plan


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


Current Visit: Yes   Status: Acute   Code(s): D47.2 - MONOCLONAL GAMMOPATHY   

SNOMED Code(s): 753270694


   





(2) B12 deficiency anemia


Current Visit: Yes   Status: Acute   Code(s): D51.9 - VITAMIN B12 DEFICIENCY 

ANEMIA, UNSPECIFIED   SNOMED Code(s): 39444608


   





(3) Urinary tract infection


Current Visit: Yes   Status: Acute   Code(s): N39.0 - URINARY TRACT INFECTION, 

SITE NOT SPECIFIED   SNOMED Code(s): 34365243


   





(4) Pancytopenia


Current Visit: No   Status: Acute   Priority: Medium   Code(s): D61.818 - OTHER 

PANCYTOPENIA   SNOMED Code(s): 231348009


   


Plan: 





Recheck her anemia panel and will supplement as needed. 





Hold NSAIDS, ASA, Anticoagulation with paltelets less than 50K





Monitor for DIC in active infection with decreased blood counts





Physician Attest: I have completed the full history and physical and agree with 

above dictation, dictated as a scribe.

## 2021-04-23 VITALS — TEMPERATURE: 97.4 F | HEART RATE: 63 BPM | SYSTOLIC BLOOD PRESSURE: 155 MMHG | DIASTOLIC BLOOD PRESSURE: 80 MMHG

## 2021-04-23 VITALS — RESPIRATION RATE: 18 BRPM

## 2021-04-23 LAB
ALBUMIN SERPL-MCNC: 2.6 G/DL (ref 3.5–5)
ALBUMIN/GLOB SERPL: 1 {RATIO}
ALP SERPL-CCNC: 57 U/L (ref 38–126)
ALT SERPL-CCNC: 19 U/L (ref 4–34)
ANION GAP SERPL CALC-SCNC: 1 MMOL/L
AST SERPL-CCNC: 25 U/L (ref 14–36)
BASOPHILS # BLD AUTO: 0 X 10*3/UL (ref 0–0.1)
BASOPHILS NFR BLD AUTO: 0 %
BUN SERPL-SCNC: 12 MG/DL (ref 7–17)
CALCIUM SPEC-MCNC: 9.2 MG/DL (ref 8.4–10.2)
CHLORIDE SERPL-SCNC: 108 MMOL/L (ref 98–107)
CO2 SERPL-SCNC: 28 MMOL/L (ref 22–30)
EOSINOPHIL # BLD AUTO: 0.04 X 10*3/UL (ref 0.04–0.35)
EOSINOPHIL NFR BLD AUTO: 1.3 %
ERYTHROCYTE [DISTWIDTH] IN BLOOD BY AUTOMATED COUNT: 2.29 X 10*6/UL (ref 4.1–5.2)
ERYTHROCYTE [DISTWIDTH] IN BLOOD: 13.2 % (ref 11.5–14.5)
FIBRINOGEN PPP-MCNC: 348 MG/DL (ref 200–500)
GLOBULIN SER CALC-MCNC: 2.6 G/DL
GLUCOSE SERPL-MCNC: 91 MG/DL (ref 74–99)
HCT VFR BLD AUTO: 25.8 % (ref 37.2–46.3)
HGB BLD-MCNC: 8.7 G/DL (ref 12–15)
INR PPP: 1 (ref ?–1.2)
IRON SERPL-MCNC: 71 UG/DL (ref 50–170)
LDH SPEC-CCNC: 408 U/L (ref 313–618)
LYMPHOCYTES # SPEC AUTO: 1.46 X 10*3/UL (ref 0.9–5)
LYMPHOCYTES NFR SPEC AUTO: 45.9 %
MACROCYTES BLD QL SMEAR: (no result)
MCH RBC QN AUTO: 38 PG (ref 27–32)
MCHC RBC AUTO-ENTMCNC: 33.7 G/DL (ref 32–37)
MCV RBC AUTO: 112.7 FL (ref 80–97)
MONOCYTES # BLD AUTO: 0.29 X 10*3/UL (ref 0.2–1)
MONOCYTES NFR BLD AUTO: 9.1 %
NEUTROPHILS # BLD AUTO: 1.38 X 10*3/UL (ref 1.8–7.7)
NEUTROPHILS NFR BLD AUTO: 43.4 %
PLATELET # BLD AUTO: 35 X 10*3/UL (ref 140–440)
POTASSIUM SERPL-SCNC: 3.8 MMOL/L (ref 3.5–5.1)
PROT SERPL-MCNC: 5.2 G/DL (ref 6.3–8.2)
PT BLD: 10.4 SEC (ref 9–12)
SODIUM SERPL-SCNC: 137 MMOL/L (ref 137–145)
TIBC SERPL-MCNC: 210 UG/DL (ref 228–460)
WBC # BLD AUTO: 3.18 X 10*3/UL (ref 4.5–10)

## 2021-04-23 RX ADMIN — CEFAZOLIN SCH: 330 INJECTION, POWDER, FOR SOLUTION INTRAMUSCULAR; INTRAVENOUS at 03:56

## 2021-04-23 RX ADMIN — DORZOLAMIDE HYDROCHLORIDE AND TIMOLOL MALEATE SCH DROPS: 20; 5 SOLUTION/ DROPS OPHTHALMIC at 07:34

## 2021-04-23 RX ADMIN — AMMONIUM LACTATE SCH APPLIC: 12 LOTION TOPICAL at 07:34

## 2021-04-23 RX ADMIN — FAMOTIDINE SCH MG: 20 TABLET, FILM COATED ORAL at 07:33

## 2021-04-23 RX ADMIN — CEFAZOLIN SCH MLS/HR: 330 INJECTION, POWDER, FOR SOLUTION INTRAMUSCULAR; INTRAVENOUS at 03:56

## 2021-04-23 NOTE — P.GSHP
History of Present Illness


H&P Date: 21


Chief Complaint: Painful dystrophic nails 10 painful mycotic nails 10





his is an 87-year-old  female patient with past history of 

hypertension, glaucoma, chronic kidney disease stage IIIB, chronic gout, chronic

anemia with history of acute GI bleed secondary to acute ischemic colitis and 

gastritis.  She is being seen today at the request of medicine for treatment of 

painful thickened dystrophic mycotic nails.  Patient is unable to take care of 

these due to patient systemic disease.  She states they've been increasingly 

painful over the last several months increasing shoes and ambulation She 

presented to [facility] emergency center due to fever that have

been going on for 2-3 days.  She also had cough and mild shortness of breath.  

She denied dysuria, no abdominal pain.  There was generalized weakness and 

muscle aches.  Temperature was 101.7, heart rate 75, blood pressure 144/65, 

pulse ox 90% on room air.  EKG sinus rhythm with no acute ST changes.  WBC 4, 

hemoglobin 10.5, platelet count 42.  Sodium 133, potassium 4.3, chloride 102, 

CO2 28, BUN 21 and creatinine 1.38, blood sugar 97.  Liver function tests were 

normal.  Magnesium 1.7.  C-reactive protein 15.8.  .  Lactic acid 0.8.  

Urinalysis cloudy with nitrate positive, leukoesterase moderate, WBC 74, 

squamous cells 7, few WBC clumps.  Many bacteria.  Coronavirus PCR not detected.

 Chest x-ray reveals no acute cardiopulmonary process.  Large hiatal hernia.  

Patient was placed in the observation unit and started on Rocephin, urine and 

blood culture obtained.








- Constitutional


Constitutional: Denies chills, Denies fever





- Cardiovascular


Cardiovascular: Denies chest pain, Denies shortness of breath





- Musculoskeletal


Musculoskeletal: Denies myalgias





- Integumentary


Comment: 





Patient has thick and painful mycotic nails 10 subungual debris to the proximal

nailfold


Integumentary: Denies pruritus, Denies rash





Past Medical History


Past Medical History: Hypertension


Additional Past Medical History / Comment(s): glaucoma, gout, anemia, 

thrombocytopenia, diverticulosis, ischemic colitis


History of Any Multi-Drug Resistant Organisms: None Reported


Past Surgical History: Joint Replacement, Orthopedic Surgery


Additional Past Surgical History / Comment(s): Partial right hip replacement, 

bilateral cataract removal and intraocular lens implants


Past Anesthesia/Blood Transfusion Reactions: No Reported Reaction


Past Psychological History: No Psychological Hx Reported


Smoking Status: Former smoker


Past Alcohol Use History: None Reported


Past Drug Use History: None Reported





- Past Family History


  ** Father


Additional Family Medical History / Comment(s): Father  in his 60s





  ** Mother


Additional Family Medical History / Comment(s): Mother  in her 90s from old 

age.  Patient has 1 sister with no major medical problems.  Patient does not 

have any brothers or children.





Medications and Allergies


                                Home Medications











 Medication  Instructions  Recorded  Confirmed  Type


 


Carvedilol [Coreg] 12.5 mg PO BID 18 History


 


allopurinoL [Zyloprim] 100 mg PO DAILY 18 History


 


Latanoprost Ophth [Xalatan 0.005%] 1 drop BOTH EYES HS 18 History


 


traMADol HCL [Ultram] 50 mg PO TID PRN 19 History


 


Dorzolamide-Timol 2.23%/0.68% 1 drop RIGHT EYE BID 21 History





[Cosopt]    


 


Famotidine [Pepcid] 20 mg PO DAILY 21 History


 


Metoclopramide [Reglan] 5 mg PO BID-W/MEALS PRN 21 History


 


Metoclopramide [Reglan] 5 mg PO HS PRN 21 History


 


Ciprofloxacin HCl [Cipro] 250 mg PO BID #20 tab 21  Rx








                                    Allergies











Allergy/AdvReac Type Severity Reaction Status Date / Time


 


adhesive tape Allergy  Unknown Verified 21 19:23


 


dexamethasone Allergy  Unknown Verified 21 19:23


 


neomycin Allergy  Unknown Verified 21 19:23


 


polymyxin B Allergy  Unknown Verified 21 19:23














Surgical - Exam


                                   Vital Signs











Temp Pulse Resp BP Pulse Ox


 


 101.7 F H  75   18   144/65   98 


 


 21 17:20  21 17:20  21 17:20  21 17:20  21 17:20














- Cardiovascular





Pedal pulses are diminished bilateral is no digital hair.  There is atrophy of 

the dermis bilateral with thinning of the skin





- Integumentary





Elongated painful mycotic nails 10 causing localized erythema edema and pain of

 digits 1 through 5 bilateral





- Neurologic





All epicritic and pallesthetic sensations grossly intact symmetrical bilateral





- Musculoskeletal





Decreased range of motion ankle joint with normal range of motion subtalar joint

 and midtarsal joint and metatarsophalangeal joint bilateral patient does has 

hallux abductovalgus deformity and hammertoes bilateral.  All inverters everters

 plantar flexors dorsiflexors grossly intact symmetrical bilateral





Results





- Labs





                                 21 06:07





                                 21 06:07


                  Abnormal Lab Results - Last 24 Hours (Table)











  21 Range/Units





  06:07 06:07 


 


WBC  3.18 L   (4.50-10.00)  X 10*3/uL


 


RBC  2.29 L   (4.10-5.20)  X 10*6/uL


 


Hgb  8.7 L   (12.0-15.0)  g/dL


 


Hct  25.8 L   (37.2-46.3)  %


 


MCV  112.7 H   (80.0-97.0)  fL


 


MCH  38.0 H   (27.0-32.0)  pg


 


Plt Count  35 L   (140-440)  X 10*3/uL


 


Plt Count Comment  A   


 


MPV  13.3 H   (9.5-12.2)  fL


 


Neutrophils #  1.38 L   (1.80-7.70)  X 10*3/uL


 


Chloride   108 H  ()  mmol/L


 


Total Protein   5.2 L  (6.3-8.2)  g/dL


 


Albumin   2.6 L  (3.5-5.0)  g/dL








                      Microbiology - Last 24 Hours (Table)











 21 18:30 Blood Culture - Preliminary





 Blood    No Growth after 72 hours


 


 21 18:15 Blood Culture - Preliminary





 Blood    No Growth after 72 hours








                                 Diabetes panel











  21 Range/Units





  06:07 


 


Sodium  137  (137-145)  mmol/L


 


Potassium  3.8  (3.5-5.1)  mmol/L


 


Chloride  108 H  ()  mmol/L


 


Carbon Dioxide  28  (22-30)  mmol/L


 


BUN  12  (7-17)  mg/dL


 


Creatinine  0.99  (0.52-1.04)  mg/dL


 


Glucose  91  (74-99)  mg/dL


 


Calcium  9.2  (8.4-10.2)  mg/dL


 


AST  25  (14-36)  U/L


 


ALT  19  (4-34)  U/L


 


Alkaline Phosphatase  57  ()  U/L


 


Total Protein  5.2 L  (6.3-8.2)  g/dL


 


Albumin  2.6 L  (3.5-5.0)  g/dL








                                  Calcium panel











  21 Range/Units





  06:07 


 


Calcium  9.2  (8.4-10.2)  mg/dL


 


Albumin  2.6 L  (3.5-5.0)  g/dL








                                 Pituitary panel











  21 Range/Units





  06:07 


 


Sodium  137  (137-145)  mmol/L


 


Potassium  3.8  (3.5-5.1)  mmol/L


 


Chloride  108 H  ()  mmol/L


 


Carbon Dioxide  28  (22-30)  mmol/L


 


BUN  12  (7-17)  mg/dL


 


Creatinine  0.99  (0.52-1.04)  mg/dL


 


Glucose  91  (74-99)  mg/dL


 


Calcium  9.2  (8.4-10.2)  mg/dL








                                  Adrenal panel











  21 Range/Units





  06:07 


 


Sodium  137  (137-145)  mmol/L


 


Potassium  3.8  (3.5-5.1)  mmol/L


 


Chloride  108 H  ()  mmol/L


 


Carbon Dioxide  28  (22-30)  mmol/L


 


BUN  12  (7-17)  mg/dL


 


Creatinine  0.99  (0.52-1.04)  mg/dL


 


Glucose  91  (74-99)  mg/dL


 


Calcium  9.2  (8.4-10.2)  mg/dL


 


Total Bilirubin  0.3  (0.2-1.3)  mg/dL


 


AST  25  (14-36)  U/L


 


ALT  19  (4-34)  U/L


 


Alkaline Phosphatase  57  ()  U/L


 


Total Protein  5.2 L  (6.3-8.2)  g/dL


 


Albumin  2.6 L  (3.5-5.0)  g/dL














Assessment and Plan


Assessment: 





Painful mycotic nails 10 with peripheral vascular disease bilateral


Plan: 





Discussed with patient findings and treatment plan.  Due to the patient systemic

 disease and peripheral arterial disease patient would benefit from reduction of

 these nails.  Today these nails were manually debrided 10 removing all mycotic

 debris possible under this conditions.  We discussed with patient mycosis and 

treatment of mycosis.  Patient is to use vicks upon discharge discussed with 

patient following up with us or another podiatrist to prevent further morbidity 

to the foot.


Time with Patient: Less than 30

## 2021-04-23 NOTE — P.DS
Providers


Date of admission: 


04/19/21 22:58





Expected date of discharge: 04/23/21


Attending physician: 


Terence Sheldon





Consults: 





                                        





04/21/21 08:16


Consult Physician Routine 


   Consulting Provider: Mekhi Royal


   Consult Reason/Comments: Trim toenails


   Do you want consulting provider notified?: Yes





04/22/21 08:23


Consult Physician Routine 


   Consulting Provider: Cristóbal Russell


   Consult Reason/Comments: thrombocytopenia


   Do you want consulting provider notified?: Yes











Primary care physician: 


Terence Sheldon





Hospital Course: 





HISTORY OF PRESENT ILLNESS:


This is an 87-year-old  female patient with past history of 

hypertension, glaucoma, chronic kidney disease stage IIIB, chronic gout, chronic

anemia with history of acute GI bleed secondary to acute ischemic colitis and 

gastritis.  She presented to [facility] emergency center due to

fever that have been going on for 2-3 days.  She also had cough and mild 

shortness of breath.  She denied dysuria, no abdominal pain.  There was 

generalized weakness and muscle aches.  Temperature was 101.7, heart rate 75, 

blood pressure 144/65, pulse ox 90% on room air.  EKG sinus rhythm with no acute

ST changes.  WBC 4, hemoglobin 10.5, platelet count 42.  Sodium 133, potassium 

4.3, chloride 102, CO2 28, BUN 21 and creatinine 1.38, blood sugar 97.  Liver 

function tests were normal.  Magnesium 1.7.  C-reactive protein 15.8.  . 

Lactic acid 0.8.  Urinalysis cloudy with nitrate positive, leukoesterase 

moderate, WBC 74, squamous cells 7, few WBC clumps.  Many bacteria.  Coronavirus

PCR not detected.  Chest x-ray reveals no acute cardiopulmonary process.  Large 

hiatal hernia.  Patient was placed in the observation unit and started on 

Rocephin, urine and blood culture obtained.





4/20: Patient is seen today in the emergency center, waiting for [facility] bed.  

She has been afebrile, heart rate 65, blood pressure 160/78, pulse ox 100% on 

room air.  Repeat blood work revealed WBC 3.8, hemoglobin 10, platelet count 30.

 Sodium 134, potassium 4.3, chloride 104, CO2 24, BUN 23 and creatinine 1.21.  

Blood sugar 79.  Blood culture is in progress.  Urine culture in progress.  

Patient is followed by PT and OT as well as  for discharge planning.





4/21: Blood cultures no growth after 24 hours 2 specimens.  Urine culture is 

gram-negative bacilli and sensitivity not available.  Patient continued on 

Rocephin.  She is denying any new complaints.  No abdominal pain.  No blood in 

her stools.  She has been afebrile, heart rate 62, blood pressure 129/64, pulse 

ox 97% on 2 L nasal cannula.  Anticipate probable discharge to [facility] tomorrow.





4/22: Patient is feeling a lot better, she will be seen in consultation by 

hematology oncology due to her , an thrombocytopenia she follows up with them as

an outpatient, she'll be seen by podiatry today for nail clipping, hopefully she

will be able to discharge home tomorrow morning 3





PHYSICAL EXAMINATION:


General: This is an 87-year-old  female.  She is resting on the ER 

stretcher and appears to be uncomfortable.  No acute respiratory distress.  


HEENT: Head is atraumatic, normocephalic, pupils were equal round reactive to 

light and recommendation, extraocular muscle movement were intact, sclera 

nonicteric, conjunctivae were pale, mucous membranes of the mouth are somewhat 

dry.


Neck: Supple, no JVP, normal carotid upstroke bilaterally, no lymphadenopathy.


Chest: Decreased breath sounds at the bases, few rhonchi, no extremity wheezes, 

no chest wall tenderness, no intercostal retractions.


Heart: First heart sound is normal, second heart sounds normal


Abdomen: Soft, nontender, nondistended, positive bowel sounds.


Extremities: There is no edema no calf tenderness DP +2 bilaterally.


Neurologic examination: Patient is awake alert and oriented lert and oriented 

x3, cranial nerves II-12 appear grossly intact, muscle power were 5 out of 5 in 

upper extremities and 5 out of 5 in bilateral lower extremities, deep tendon 

reflexes normal bilaterally.





ASSESSMENT AND PLAN:


1.  Febrile illness secondary to acute urinary tract infection.  





2.  Hypertension and hypertensive cardiovascular disease .





3.  Glaucoma.  





4.  History of GI bleed secondary to ask acute ischemic colitis and gastritis.





5.  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 





6.  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





7.  Chronic gout.





8.  Chronic kidney disease stage IIIB. 





DISCHARGE PLAN:


[facility] for subacute rehab today.


Patient Condition at Discharge: Stable





Plan - Discharge Summary


Discharge Rx Participant: No


New Discharge Prescriptions: 


New


   Ciprofloxacin HCl [Cipro] 250 mg PO BID #20 tab





Continue


   allopurinoL [Zyloprim] 100 mg PO DAILY


   Carvedilol [Coreg] 12.5 mg PO BID


   Latanoprost Ophth [Xalatan 0.005%] 1 drop BOTH EYES HS


   traMADol HCL [Ultram] 50 mg PO TID PRN


     PRN Reason: Severe Pain


   Metoclopramide [Reglan] 5 mg PO HS PRN


     PRN Reason: GERD


   Metoclopramide [Reglan] 5 mg PO BID-W/MEALS PRN


     PRN Reason: GERD


   Famotidine [Pepcid] 20 mg PO DAILY


   Dorzolamide-Timol 2.23%/0.68% [Cosopt] 1 drop RIGHT EYE BID


Discharge Medication List





Carvedilol [Coreg] 12.5 mg PO BID 07/18/18 [History]


allopurinoL [Zyloprim] 100 mg PO DAILY 07/18/18 [History]


Latanoprost Ophth [Xalatan 0.005%] 1 drop BOTH EYES HS 07/19/18 [History]


traMADol HCL [Ultram] 50 mg PO TID PRN 03/28/19 [History]


Dorzolamide-Timol 2.23%/0.68% [Cosopt] 1 drop RIGHT EYE BID 04/19/21 [History]


Famotidine [Pepcid] 20 mg PO DAILY 04/19/21 [History]


Metoclopramide [Reglan] 5 mg PO BID-W/MEALS PRN 04/19/21 [History]


Metoclopramide [Reglan] 5 mg PO HS PRN 04/19/21 [History]


Ciprofloxacin HCl [Cipro] 250 mg PO BID #20 tab 04/23/21 [Rx]








Follow up Appointment(s)/Referral(s): 


Terence Sheldon MD [Primary Care Provider] - 04/29/21 1:30 pm


Discharge Disposition: HOME SELF-CARE

## 2021-04-23 NOTE — P.PN
Subjective


Progress Note Date: 04/22/21





HISTORY OF PRESENT ILLNESS:


This is an 87-year-old  female patient with past history of 

hypertension, glaucoma, chronic kidney disease stage IIIB, chronic gout, chronic

anemia with history of acute GI bleed secondary to acute ischemic colitis and 

gastritis.  She presented to [facility] emergency center due to

fever that have been going on for 2-3 days.  She also had cough and mild 

shortness of breath.  She denied dysuria, no abdominal pain.  There was 

generalized weakness and muscle aches.  Temperature was 101.7, heart rate 75, 

blood pressure 144/65, pulse ox 90% on room air.  EKG sinus rhythm with no acute

ST changes.  WBC 4, hemoglobin 10.5, platelet count 42.  Sodium 133, potassium 

4.3, chloride 102, CO2 28, BUN 21 and creatinine 1.38, blood sugar 97.  Liver 

function tests were normal.  Magnesium 1.7.  C-reactive protein 15.8.  . 

Lactic acid 0.8.  Urinalysis cloudy with nitrate positive, leukoesterase 

moderate, WBC 74, squamous cells 7, few WBC clumps.  Many bacteria.  Coronavirus

PCR not detected.  Chest x-ray reveals no acute cardiopulmonary process.  Large 

hiatal hernia.  Patient was placed in the observation unit and started on 

Rocephin, urine and blood culture obtained.





4/20: Patient is seen today in the emergency center, waiting for [facility] bed.  

She has been afebrile, heart rate 65, blood pressure 160/78, pulse ox 100% on 

room air.  Repeat blood work revealed WBC 3.8, hemoglobin 10, platelet count 30.

 Sodium 134, potassium 4.3, chloride 104, CO2 24, BUN 23 and creatinine 1.21.  

Blood sugar 79.  Blood culture is in progress.  Urine culture in progress.  

Patient is followed by PT and OT as well as  for discharge planning.





4/21: Blood cultures no growth after 24 hours 2 specimens.  Urine culture is 

gram-negative bacilli and sensitivity not available.  Patient continued on 

Rocephin.  She is denying any new complaints.  No abdominal pain.  No blood in 

her stools.  She has been afebrile, heart rate 62, blood pressure 129/64, pulse 

ox 97% on 2 L nasal cannula.  Anticipate probable discharge to [facility] tomorrow.





4/22: Patient is feeling a lot better, she will be seen in consultation by hemat

ology oncology due to her , an thrombocytopenia she follows up with them as an 

outpatient, she'll be seen by podiatry today for nail clipping, hopefully she 

will be able to discharge home tomorrow morning 3





REVIEW OF SYSTEMS:


Constitutional: No documented fever, no chills, no night sweats.  No weight 

change.  No weakness, fatigue or lethargy.  No daytime sleepiness.


EENT: No headache.  No blurred vision or double vision, no loss of vision.  No 

loss of Hearing, no ringing in the ears, no dizziness.  No nasal drainage or 

congestion.  No epistaxis.  No sore throat.


Lungs: No shortness of breath, no cough, no sputum production.  No wheezing.  

Reports dyspnea with activity.


Cardiovascular: No chest pain, no lower extremity edema.  No palpitations.  No 

paroxysmal nocturnal dyspnea.  No orthopnea.  No lightheadedness or dizziness.  

No syncopal episodes.


Abdominal: Denies abdominal pain.  No nausea, vomiting.  No diarrhea.  No 

constipation.  No bloody or tarry stools reports loss of appetite.


Genitourinary: No dysuria, increased frequency, urgency.  No urinary retention.


Musculoskeletal: No myalgias.  No muscle weakness, no gait dysfunction, no 

frequent falls.  No back pain.  No neck pain.


Integumentary: No wounds, no lesions.  No rash or pruritus.  No unusual b

ruising.  No change in hair or nails.


Neurologic: No aphasia. No facial droop. No change in mentation. No head injury.

No headache. No paralysis. No paresthesia.


Psychiatric: No depression.  No anxiety.  No mood swings.


Endocrine: No abnormal blood sugars.  No weight change.  





PHYSICAL EXAMINATION:


General: This is an 87-year-old  female.  She is resting on the ER 

stretcher and appears to be uncomfortable.  No acute respiratory distress.  


HEENT: Head is atraumatic, normocephalic, pupils were equal round reactive to 

light and recommendation, extraocular muscle movement were intact, sclera 

nonicteric, conjunctivae were pale, mucous membranes of the mouth are somewhat 

dry.


Neck: Supple, no JVP, normal carotid upstroke bilaterally, no lymphadenopathy.


Chest: Decreased breath sounds at the bases, few rhonchi, no extremity wheezes, 

no chest wall tenderness, no intercostal retractions.


Heart: First heart sound is normal, second heart sounds normal


Abdomen: Soft, nontender, nondistended, positive bowel sounds.


Extremities: There is no edema no calf tenderness DP +2 bilaterally.


Neurologic examination: Patient is awake alert and oriented lert and oriented 

x3, cranial nerves II-12 appear grossly intact, muscle power were 5 out of 5 in 

upper extremities and 5 out of 5 in bilateral lower extremities, deep tendon 

reflexes normal bilaterally.





ASSESSMENT AND PLAN:


1.  Febrile illness secondary to acute urinary tract infection.  Patient started

on Rocephin 1 g IV piggyback daily, urine culture, blood culture, IV fluid 0.9 

normal saline at 75 mL per hour.  Await finalized urine culture.





2.  Hypertension, stable.





3.  Glaucoma.  Continue current eyedrops.





4.  History of GI bleed secondary to ask acute ischemic colitis and gastritis.





5.  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  Hemoglobin is stable.





6.  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





7.  Chronic gout.





8.  Chronic kidney disease stage IIIB.  Avoid hypotension, nephrotoxic agents.





9.  GI prophylaxis.  Continue Pepcid 20 mg daily.





10.  DVT prophylaxis.  SCDs and MARLO hose.  No heparin due to thrombocytopenia.





DISCHARGE PLAN:


[facility] for subacute rehab on Thursday





Objective





- Vital Signs


Vital signs: 


                                   Vital Signs











Temp  97.9 F   04/22/21 00:49


 


Pulse  50 L  04/22/21 00:49


 


Resp  16   04/22/21 01:35


 


BP  134/74   04/22/21 00:49


 


Pulse Ox  98   04/22/21 00:49








                                 Intake & Output











 04/21/21 04/22/21 04/22/21





 18:59 06:59 18:59


 


Intake Total 765 460 


 


Balance 765 460 


 


Intake:   


 


    


 


    Sodium Chloride 0.9% 1, 525  





    000 ml @ 75 mls/hr IV .   





    [id number] RAMONA Rx#:[id number]   


 


  Intake, IV Titration  460 





  Amount   


 


    Sodium Chloride 0.9% 1,  460 





    000 ml @ 75 mls/hr IV .   





    [id number] RAMONA Rx#:[id number]   


 


  Oral 240  


 


Other:   


 


  Voiding Method Bedpan Bedpan 





 Diaper Diaper 





 Incontinent Incontinent 


 


  # Voids 2 3 














- Labs


CBC & Chem 7: 


                                 04/21/21 08:24





                                 04/23/21 06:07


Labs: 


                  Abnormal Lab Results - Last 24 Hours (Table)











  04/21/21 04/21/21 Range/Units





  08:24 08:24 


 


WBC  2.6 L   (3.8-10.6)  k/uL


 


RBC  2.74 L   (3.80-5.40)  m/uL


 


Hgb  10.0 L   (11.4-16.0)  gm/dL


 


Hct  31.5 L   (34.0-46.0)  %


 


MCV  115.0 H   (80.0-100.0)  fL


 


MCH  36.6 H   (25.0-35.0)  pg


 


Plt Count  37 L   (150-450)  k/uL


 


Lymphocytes #  0.8 L   (1.0-4.8)  k/uL


 


Lymphocytes # (Manual)  0.78 L   (1.0-4.8)  k/uL


 


Macrocytosis  Marked A   


 


Sodium   136 L  (137-145)  mmol/L


 


Creatinine   1.05 H  (0.52-1.04)  mg/dL


 


Glucose   100 H  (74-99)  mg/dL








                      Microbiology - Last 24 Hours (Table)











 04/19/21 21:33 Urine Culture - Final





 Urine,Voided    Escherichia coli


 


 04/19/21 18:30 Blood Culture - Preliminary





 Blood    No Growth after 48 hours


 


 04/19/21 18:15 Blood Culture - Preliminary





 Blood    No Growth after 48 hours

## 2021-04-23 NOTE — P.PN
Subjective


Progress Note Date: 04/23/21


Principal diagnosis: 





Anemia





Awaiting anemia work-up  plan per primary for patient to be discharge, ok with 

hematology will follow-up on labs drawn and see in office to provide supplement 

if replacement needed





Objective





- Vital Signs


Vital signs: 


                                   Vital Signs











Temp  97.8 F   04/23/21 07:50


 


Pulse  54 L  04/23/21 07:50


 


Resp  18   04/23/21 08:00


 


BP  148/78   04/23/21 07:50


 


Pulse Ox  98   04/23/21 07:50








                                 Intake & Output











 04/22/21 04/23/21 04/23/21





 18:59 06:59 18:59


 


Intake Total 240 460 


 


Balance 240 460 


 


Intake:   


 


  Intake, IV Titration  460 





  Amount   


 


    Sodium Chloride 0.9% 1,  460 





    000 ml @ 75 mls/hr IV .   





    [id number] RAMONA Rx#:[id number]   


 


  Oral 240  


 


Other:   


 


  Voiding Method Bedpan Bedpan Bedpan





 Diaper Diaper Diaper





 Incontinent Incontinent Incontinent


 


  # Voids 1 3 


 


  # Bowel Movements 0  














- Exam





- Constitutional


General appearance: cooperative, no acute distress





- EENT


Eyes: EOMI, PERRLA


ENT: hard of hearing, NA/AT





- Neck


Neck: normal ROM





- Respiratory


Respiratory: bilateral: diminished





- Cardiovascular


Rhythm: regularly irregular





- Integumentary


Integumentary: pale





- Musculoskeletal


Musculoskeletal: generalized weakness, strength equal bilaterally





- Psychiatric


Psychiatric: A&O x's 3








- Labs


CBC & Chem 7: 


                                 04/23/21 06:07





                                 04/23/21 06:07


Labs: 


                  Abnormal Lab Results - Last 24 Hours (Table)











  04/23/21 04/23/21 Range/Units





  06:07 06:07 


 


WBC  3.18 L   (4.50-10.00)  X 10*3/uL


 


RBC  2.29 L   (4.10-5.20)  X 10*6/uL


 


Hgb  8.7 L   (12.0-15.0)  g/dL


 


Hct  25.8 L   (37.2-46.3)  %


 


MCV  112.7 H   (80.0-97.0)  fL


 


MCH  38.0 H   (27.0-32.0)  pg


 


Plt Count  35 L   (140-440)  X 10*3/uL


 


Plt Count Comment  A   


 


MPV  13.3 H   (9.5-12.2)  fL


 


Neutrophils #  1.38 L   (1.80-7.70)  X 10*3/uL


 


Chloride   108 H  ()  mmol/L


 


Total Protein   5.2 L  (6.3-8.2)  g/dL


 


Albumin   2.6 L  (3.5-5.0)  g/dL








                      Microbiology - Last 24 Hours (Table)











 04/19/21 18:30 Blood Culture - Preliminary





 Blood    No Growth after 72 hours


 


 04/19/21 18:15 Blood Culture - Preliminary





 Blood    No Growth after 72 hours














Assessment and Plan


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


Status: Acute   Code(s): D47.2 - MONOCLONAL GAMMOPATHY   SNOMED Code(s): 

059725615


   





(2) B12 deficiency anemia


Status: Acute   Code(s): D51.9 - VITAMIN B12 DEFICIENCY ANEMIA, UNSPECIFIED   

SNOMED Code(s): 18950386


   





(3) Urinary tract infection


Status: Acute   Code(s): N39.0 - URINARY TRACT INFECTION, SITE NOT SPECIFIED   

SNOMED Code(s): 77124654


   





(4) Pancytopenia


Status: Acute   Priority: Medium   Code(s): D61.818 - OTHER PANCYTOPENIA   

SNOMED Code(s): 185041581


   


Plan: 





Macrocytosis and Anemia


Pancytopenia








Recheck her anemia panel and will supplement as needed. 





Hold NSAIDS, ASA, Anticoagulation with paltelets less than 50K


Follow-up in office to review repeat anemia and follow up in 7-10 days for 

supplementation, discussed with RN.

## 2021-04-24 LAB
FERRITIN SERPL-MCNC: 83.9 NG/ML (ref 10–291)
VIT B12 SERPL-MCNC: 1326 PG/ML (ref 200–944)

## 2021-04-26 LAB
ALBUMIN SERPL ELPH-MCNC: 0 G/DL (ref 3.8–4.9)
GAMMA GLOB SERPL ELPH-MCNC: 0 G/DL (ref 0.7–1.5)
IGA SERPL-MCNC: 119 MG/DL (ref 60–350)
IGG SERPL-MCNC: 698 MG/DL (ref 700–1600)
IGM SERPL-MCNC: 456 MG/DL (ref 40–280)

## 2021-04-27 LAB — KAPPA LC FREE SER NEPH-MCNC: 10.8 MG/DL (ref 0.33–1.94)
